# Patient Record
Sex: FEMALE | Race: WHITE | NOT HISPANIC OR LATINO | Employment: OTHER | URBAN - NONMETROPOLITAN AREA
[De-identification: names, ages, dates, MRNs, and addresses within clinical notes are randomized per-mention and may not be internally consistent; named-entity substitution may affect disease eponyms.]

---

## 2023-09-20 ENCOUNTER — DOCUMENTATION (OUTPATIENT)
Dept: PRIMARY CARE | Facility: CLINIC | Age: 83
End: 2023-09-20
Payer: MEDICARE

## 2023-11-07 ENCOUNTER — DOCUMENTATION (OUTPATIENT)
Dept: PRIMARY CARE | Facility: CLINIC | Age: 83
End: 2023-11-07
Payer: MEDICARE

## 2023-11-07 NOTE — PROGRESS NOTES
Attempted to reach patient for monthly follow up calls & was not successful. Left voicemail message with  info & request to return call. Awaiting response.

## 2023-11-14 ENCOUNTER — DOCUMENTATION (OUTPATIENT)
Dept: PRIMARY CARE | Facility: CLINIC | Age: 83
End: 2023-11-14
Payer: MEDICARE

## 2023-11-14 DIAGNOSIS — G80.9 CEREBRAL PALSY, UNSPECIFIED TYPE (MULTI): ICD-10-CM

## 2023-11-14 DIAGNOSIS — E78.5 HYPERLIPIDEMIA, UNSPECIFIED HYPERLIPIDEMIA TYPE: ICD-10-CM

## 2023-11-14 DIAGNOSIS — M81.0 OSTEOPOROSIS, UNSPECIFIED OSTEOPOROSIS TYPE, UNSPECIFIED PATHOLOGICAL FRACTURE PRESENCE: ICD-10-CM

## 2023-11-14 PROCEDURE — 99490 CHRNC CARE MGMT STAFF 1ST 20: CPT | Performed by: FAMILY MEDICINE

## 2023-11-14 NOTE — PROGRESS NOTES
Contacted patient for monthly outreach & states is doing well. Has had both flu & COVID vaccines & continues to wear mask when out & washes hands frequently. Still drives & lives independently, has friends & neighbors who provide assistance when needed. Will be contacting doctor's office to set up Prolia injection which is due in January. Also needs to schedule necessary labs. Taking all medications as prescribed & remains on Sertraline which helps with anxiety. Agreeable to monthly follow up calls.

## 2023-12-03 ENCOUNTER — HOSPITAL ENCOUNTER (INPATIENT)
Facility: HOSPITAL | Age: 83
LOS: 4 days | Discharge: NURSING, REHAB OR LONG-TERM CARE | DRG: 536 | End: 2023-12-07
Attending: INTERNAL MEDICINE | Admitting: INTERNAL MEDICINE
Payer: MEDICARE

## 2023-12-03 ENCOUNTER — APPOINTMENT (OUTPATIENT)
Dept: RADIOLOGY | Facility: HOSPITAL | Age: 83
DRG: 536 | End: 2023-12-03
Payer: MEDICARE

## 2023-12-03 DIAGNOSIS — S32.9XXA FRACTURE OF RIGHT PELVIS, CLOSED, INITIAL ENCOUNTER (MULTI): Primary | ICD-10-CM

## 2023-12-03 LAB
ALBUMIN SERPL BCP-MCNC: 3.9 G/DL (ref 3.4–5)
ALP SERPL-CCNC: 46 U/L (ref 33–136)
ALT SERPL W P-5'-P-CCNC: 7 U/L (ref 7–45)
ANION GAP SERPL CALC-SCNC: 15 MMOL/L (ref 10–20)
AST SERPL W P-5'-P-CCNC: 15 U/L (ref 9–39)
BASOPHILS # BLD AUTO: 0.06 X10*3/UL (ref 0–0.1)
BASOPHILS NFR BLD AUTO: 0.6 %
BILIRUB SERPL-MCNC: 0.2 MG/DL (ref 0–1.2)
BUN SERPL-MCNC: 29 MG/DL (ref 6–23)
CALCIUM SERPL-MCNC: 9.4 MG/DL (ref 8.6–10.3)
CHLORIDE SERPL-SCNC: 103 MMOL/L (ref 98–107)
CO2 SERPL-SCNC: 27 MMOL/L (ref 21–32)
CREAT SERPL-MCNC: 1.25 MG/DL (ref 0.5–1.05)
EOSINOPHIL # BLD AUTO: 0.2 X10*3/UL (ref 0–0.4)
EOSINOPHIL NFR BLD AUTO: 2 %
ERYTHROCYTE [DISTWIDTH] IN BLOOD BY AUTOMATED COUNT: 15 % (ref 11.5–14.5)
GFR SERPL CREATININE-BSD FRML MDRD: 43 ML/MIN/1.73M*2
GLUCOSE SERPL-MCNC: 162 MG/DL (ref 74–99)
HCT VFR BLD AUTO: 35.6 % (ref 36–46)
HGB BLD-MCNC: 10.7 G/DL (ref 12–16)
HOLD SPECIMEN: NORMAL
IMM GRANULOCYTES # BLD AUTO: 0.05 X10*3/UL (ref 0–0.5)
IMM GRANULOCYTES NFR BLD AUTO: 0.5 % (ref 0–0.9)
LACTATE SERPL-SCNC: 2.3 MMOL/L (ref 0.4–2)
LACTATE SERPL-SCNC: 2.8 MMOL/L (ref 0.4–2)
LYMPHOCYTES # BLD AUTO: 1.61 X10*3/UL (ref 0.8–3)
LYMPHOCYTES NFR BLD AUTO: 15.7 %
MCH RBC QN AUTO: 27 PG (ref 26–34)
MCHC RBC AUTO-ENTMCNC: 30.1 G/DL (ref 32–36)
MCV RBC AUTO: 90 FL (ref 80–100)
MONOCYTES # BLD AUTO: 0.48 X10*3/UL (ref 0.05–0.8)
MONOCYTES NFR BLD AUTO: 4.7 %
NEUTROPHILS # BLD AUTO: 7.84 X10*3/UL (ref 1.6–5.5)
NEUTROPHILS NFR BLD AUTO: 76.5 %
NRBC BLD-RTO: 0 /100 WBCS (ref 0–0)
PLATELET # BLD AUTO: 364 X10*3/UL (ref 150–450)
POTASSIUM SERPL-SCNC: 4.1 MMOL/L (ref 3.5–5.3)
PROT SERPL-MCNC: 7.2 G/DL (ref 6.4–8.2)
RBC # BLD AUTO: 3.96 X10*6/UL (ref 4–5.2)
SODIUM SERPL-SCNC: 141 MMOL/L (ref 136–145)
WBC # BLD AUTO: 10.2 X10*3/UL (ref 4.4–11.3)

## 2023-12-03 PROCEDURE — 1100000001 HC PRIVATE ROOM DAILY: Mod: IPSPLIT

## 2023-12-03 PROCEDURE — 2500000004 HC RX 250 GENERAL PHARMACY W/ HCPCS (ALT 636 FOR OP/ED): Mod: IPSPLIT | Performed by: NURSE PRACTITIONER

## 2023-12-03 PROCEDURE — 72192 CT PELVIS W/O DYE: CPT

## 2023-12-03 PROCEDURE — 80053 COMPREHEN METABOLIC PANEL: CPT

## 2023-12-03 PROCEDURE — 99223 1ST HOSP IP/OBS HIGH 75: CPT | Performed by: NURSE PRACTITIONER

## 2023-12-03 PROCEDURE — 73552 X-RAY EXAM OF FEMUR 2/>: CPT | Mod: RT,FY

## 2023-12-03 PROCEDURE — 36415 COLL VENOUS BLD VENIPUNCTURE: CPT

## 2023-12-03 PROCEDURE — 85025 COMPLETE CBC W/AUTO DIFF WBC: CPT

## 2023-12-03 PROCEDURE — 2500000004 HC RX 250 GENERAL PHARMACY W/ HCPCS (ALT 636 FOR OP/ED)

## 2023-12-03 PROCEDURE — 96374 THER/PROPH/DIAG INJ IV PUSH: CPT

## 2023-12-03 PROCEDURE — 96361 HYDRATE IV INFUSION ADD-ON: CPT

## 2023-12-03 PROCEDURE — 99285 EMERGENCY DEPT VISIT HI MDM: CPT

## 2023-12-03 PROCEDURE — 36415 COLL VENOUS BLD VENIPUNCTURE: CPT | Performed by: FAMILY MEDICINE

## 2023-12-03 PROCEDURE — 73502 X-RAY EXAM HIP UNI 2-3 VIEWS: CPT | Mod: RIGHT SIDE | Performed by: RADIOLOGY

## 2023-12-03 PROCEDURE — 72192 CT PELVIS W/O DYE: CPT | Performed by: RADIOLOGY

## 2023-12-03 PROCEDURE — 73552 X-RAY EXAM OF FEMUR 2/>: CPT | Mod: RIGHT SIDE | Performed by: RADIOLOGY

## 2023-12-03 PROCEDURE — 2500000001 HC RX 250 WO HCPCS SELF ADMINISTERED DRUGS (ALT 637 FOR MEDICARE OP): Mod: IPSPLIT | Performed by: NURSE PRACTITIONER

## 2023-12-03 PROCEDURE — 73502 X-RAY EXAM HIP UNI 2-3 VIEWS: CPT | Mod: RT,FY

## 2023-12-03 PROCEDURE — 83605 ASSAY OF LACTIC ACID: CPT | Mod: IPSPLIT

## 2023-12-03 RX ORDER — SENNOSIDES 8.6 MG/1
2 TABLET ORAL 2 TIMES DAILY
Status: DISCONTINUED | OUTPATIENT
Start: 2023-12-03 | End: 2023-12-07 | Stop reason: HOSPADM

## 2023-12-03 RX ORDER — ACETAMINOPHEN 325 MG/1
650 TABLET ORAL ONCE
Status: COMPLETED | OUTPATIENT
Start: 2023-12-03 | End: 2023-12-03

## 2023-12-03 RX ORDER — PRAVASTATIN SODIUM 40 MG/1
40 TABLET ORAL NIGHTLY
COMMUNITY

## 2023-12-03 RX ORDER — SERTRALINE HYDROCHLORIDE 50 MG/1
150 TABLET, FILM COATED ORAL DAILY
Status: DISCONTINUED | OUTPATIENT
Start: 2023-12-03 | End: 2023-12-07 | Stop reason: HOSPADM

## 2023-12-03 RX ORDER — OXYCODONE HYDROCHLORIDE 5 MG/1
5 TABLET ORAL EVERY 6 HOURS PRN
Status: DISCONTINUED | OUTPATIENT
Start: 2023-12-03 | End: 2023-12-07 | Stop reason: HOSPADM

## 2023-12-03 RX ORDER — POLYETHYLENE GLYCOL 3350 17 G/17G
17 POWDER, FOR SOLUTION ORAL DAILY PRN
Status: DISCONTINUED | OUTPATIENT
Start: 2023-12-03 | End: 2023-12-07 | Stop reason: HOSPADM

## 2023-12-03 RX ORDER — PRAVASTATIN SODIUM 20 MG/1
40 TABLET ORAL NIGHTLY
Status: DISCONTINUED | OUTPATIENT
Start: 2023-12-03 | End: 2023-12-07 | Stop reason: HOSPADM

## 2023-12-03 RX ORDER — ACETAMINOPHEN 325 MG/1
975 TABLET ORAL EVERY 8 HOURS
Status: DISCONTINUED | OUTPATIENT
Start: 2023-12-03 | End: 2023-12-07 | Stop reason: HOSPADM

## 2023-12-03 RX ORDER — PANTOPRAZOLE SODIUM 40 MG/10ML
40 INJECTION, POWDER, LYOPHILIZED, FOR SOLUTION INTRAVENOUS
Status: DISCONTINUED | OUTPATIENT
Start: 2023-12-04 | End: 2023-12-07 | Stop reason: HOSPADM

## 2023-12-03 RX ORDER — HEPARIN SODIUM 5000 [USP'U]/ML
5000 INJECTION, SOLUTION INTRAVENOUS; SUBCUTANEOUS EVERY 8 HOURS
Status: DISCONTINUED | OUTPATIENT
Start: 2023-12-03 | End: 2023-12-07 | Stop reason: HOSPADM

## 2023-12-03 RX ORDER — ONDANSETRON 4 MG/1
4 TABLET, FILM COATED ORAL EVERY 8 HOURS PRN
Status: DISCONTINUED | OUTPATIENT
Start: 2023-12-03 | End: 2023-12-07 | Stop reason: HOSPADM

## 2023-12-03 RX ORDER — ONDANSETRON HYDROCHLORIDE 2 MG/ML
4 INJECTION, SOLUTION INTRAVENOUS EVERY 8 HOURS PRN
Status: DISCONTINUED | OUTPATIENT
Start: 2023-12-03 | End: 2023-12-07 | Stop reason: HOSPADM

## 2023-12-03 RX ORDER — SODIUM CHLORIDE 9 MG/ML
100 INJECTION, SOLUTION INTRAVENOUS CONTINUOUS
Status: DISCONTINUED | OUTPATIENT
Start: 2023-12-03 | End: 2023-12-05

## 2023-12-03 RX ORDER — PANTOPRAZOLE SODIUM 40 MG/1
40 TABLET, DELAYED RELEASE ORAL
Status: DISCONTINUED | OUTPATIENT
Start: 2023-12-04 | End: 2023-12-07 | Stop reason: HOSPADM

## 2023-12-03 RX ORDER — SERTRALINE HYDROCHLORIDE 100 MG/1
150 TABLET, FILM COATED ORAL DAILY
COMMUNITY

## 2023-12-03 RX ADMIN — HYDROMORPHONE HYDROCHLORIDE 0.5 MG: 1 INJECTION, SOLUTION INTRAMUSCULAR; INTRAVENOUS; SUBCUTANEOUS at 18:49

## 2023-12-03 RX ADMIN — SODIUM CHLORIDE 1000 ML: 9 INJECTION, SOLUTION INTRAVENOUS at 18:49

## 2023-12-03 RX ADMIN — ACETAMINOPHEN 650 MG: 325 TABLET ORAL at 14:30

## 2023-12-03 RX ADMIN — SENNOSIDES 17.2 MG: 8.6 TABLET, FILM COATED ORAL at 21:52

## 2023-12-03 RX ADMIN — ACETAMINOPHEN 975 MG: 325 TABLET ORAL at 21:52

## 2023-12-03 RX ADMIN — SODIUM CHLORIDE 100 ML/HR: 9 INJECTION, SOLUTION INTRAVENOUS at 22:39

## 2023-12-03 RX ADMIN — SODIUM CHLORIDE 500 ML: 900 INJECTION, SOLUTION INTRAVENOUS at 17:44

## 2023-12-03 SDOH — SOCIAL STABILITY: SOCIAL INSECURITY: WERE YOU ABLE TO COMPLETE ALL THE BEHAVIORAL HEALTH SCREENINGS?: YES

## 2023-12-03 SDOH — SOCIAL STABILITY: SOCIAL INSECURITY: HAVE YOU HAD THOUGHTS OF HARMING ANYONE ELSE?: NO

## 2023-12-03 ASSESSMENT — ACTIVITIES OF DAILY LIVING (ADL)
DRESSING YOURSELF: INDEPENDENT
GROOMING: INDEPENDENT
PATIENT'S MEMORY ADEQUATE TO SAFELY COMPLETE DAILY ACTIVITIES?: YES
HEARING - LEFT EAR: HEARING AID
ADEQUATE_TO_COMPLETE_ADL: YES
HEARING - RIGHT EAR: HEARING AID
TOILETING: INDEPENDENT
JUDGMENT_ADEQUATE_SAFELY_COMPLETE_DAILY_ACTIVITIES: YES
BATHING: INDEPENDENT
LACK_OF_TRANSPORTATION: NO
FEEDING YOURSELF: INDEPENDENT
WALKS IN HOME: INDEPENDENT

## 2023-12-03 ASSESSMENT — PAIN DESCRIPTION - ORIENTATION: ORIENTATION: RIGHT

## 2023-12-03 ASSESSMENT — COGNITIVE AND FUNCTIONAL STATUS - GENERAL
TOILETING: A LITTLE
PATIENT BASELINE BEDBOUND: NO
WALKING IN HOSPITAL ROOM: A LITTLE
MOVING FROM LYING ON BACK TO SITTING ON SIDE OF FLAT BED WITH BEDRAILS: A LITTLE
CLIMB 3 TO 5 STEPS WITH RAILING: A LITTLE
HELP NEEDED FOR BATHING: A LITTLE
DRESSING REGULAR LOWER BODY CLOTHING: A LITTLE
MOVING TO AND FROM BED TO CHAIR: A LITTLE
STANDING UP FROM CHAIR USING ARMS: A LITTLE
DAILY ACTIVITIY SCORE: 20
DRESSING REGULAR UPPER BODY CLOTHING: A LITTLE
MOBILITY SCORE: 18
TURNING FROM BACK TO SIDE WHILE IN FLAT BAD: A LITTLE

## 2023-12-03 ASSESSMENT — PAIN - FUNCTIONAL ASSESSMENT
PAIN_FUNCTIONAL_ASSESSMENT: 0-10
PAIN_FUNCTIONAL_ASSESSMENT: 0-10

## 2023-12-03 ASSESSMENT — COLUMBIA-SUICIDE SEVERITY RATING SCALE - C-SSRS
2. HAVE YOU ACTUALLY HAD ANY THOUGHTS OF KILLING YOURSELF?: NO
6. HAVE YOU EVER DONE ANYTHING, STARTED TO DO ANYTHING, OR PREPARED TO DO ANYTHING TO END YOUR LIFE?: NO
1. IN THE PAST MONTH, HAVE YOU WISHED YOU WERE DEAD OR WISHED YOU COULD GO TO SLEEP AND NOT WAKE UP?: NO

## 2023-12-03 ASSESSMENT — LIFESTYLE VARIABLES
HOW MANY STANDARD DRINKS CONTAINING ALCOHOL DO YOU HAVE ON A TYPICAL DAY: PATIENT DOES NOT DRINK
HOW OFTEN DO YOU HAVE A DRINK CONTAINING ALCOHOL: NEVER
AUDIT-C TOTAL SCORE: 0
HOW OFTEN DO YOU HAVE 6 OR MORE DRINKS ON ONE OCCASION: NEVER
AUDIT-C TOTAL SCORE: 0
SKIP TO QUESTIONS 9-10: 1

## 2023-12-03 ASSESSMENT — PAIN SCALES - GENERAL
PAINLEVEL_OUTOF10: 2
PAINLEVEL_OUTOF10: 10 - WORST POSSIBLE PAIN
PAINLEVEL_OUTOF10: 2

## 2023-12-03 ASSESSMENT — PATIENT HEALTH QUESTIONNAIRE - PHQ9
2. FEELING DOWN, DEPRESSED OR HOPELESS: NOT AT ALL
SUM OF ALL RESPONSES TO PHQ9 QUESTIONS 1 & 2: 0
1. LITTLE INTEREST OR PLEASURE IN DOING THINGS: NOT AT ALL

## 2023-12-03 ASSESSMENT — PAIN DESCRIPTION - PAIN TYPE: TYPE: ACUTE PAIN

## 2023-12-03 ASSESSMENT — PAIN DESCRIPTION - LOCATION: LOCATION: LEG

## 2023-12-03 NOTE — ED PROVIDER NOTES
Limitations to history: None  Independent Historians: Family  External Records Reviewed: HIE, OARRS, outpatient notes, inpatient notes, paper charts if needed    History of Present Illness:  Patient is a 83-year-old female presents to ED chief complaint of right hip pain.  Patient reports that she was in the shower earlier, when her left foot accidentally slipped, causing her to fall on her right hip.  Patient reports she did not lose consciousness, did not sustain head injury and is not having any neck and/or back pain.  Patient denies use of blood thinners.  Patient reports it is difficult to bear weight after the fall this morning.  Patient reports pain to her right hip, right femur.  Patient reports a past medical history of cerebral palsy.  Patient denies any dizziness, chest pain, shortness of breath prior to or after falling.  Patient is alert and oriented x 3 upon examination, and otherwise no apparent distress.      Denies HA, C/P, SOB, ABD pain, Nausea, Vomiting, Diarrhea, Weakness, Dizziness, Fever, Chills.    PMFSH:   As per HPI, otherwise nurses notes reviewed in EMR    Physical Exam:  Appearance: Alert, oriented x3, supine on exam table with head elevated, cooperative, in no acute distress. Well nourished & well hydrated.      Skin: Intact, dry skin, no lesions, rash, petechiae or purpura.     Eyes: PERRLA, EOMs intact, Conjunctiva pink with no redness or exudates. No scleral icterus.     Ears: Hearing grossly intact.      Nose: Nares patent, no epistaxis.     Mouth: Dentition without concerning abnormalities. no obstruction of posterior pharynx.     Neck: Supple, without meningismus. Trachea at midline.     Pulmonary: Clear bilaterally with good chest wall excursion. No rales, rhonchi or wheezing. No accessory muscle use or stridor. Talking in full sentences.     Cardiac: Normal S1, S2 without murmur, rub, gallop or extrasystole.     Abdomen: Soft, nontender to light and deep palpation to all  quadrants, normoactive bowel sounds.  No palpable organomegaly.  No rebound or guarding.     Genitourinary: Physical exam deferred.     Musculoskeletal: Mild pain on palpation most notable near the lateral right hip joint.  No obvious deformity and/or ecchymosis present.  Mild pain on palpation external examination of the medial and lateral right femur.  No obvious deformity and/or ecchymosis present.  Distal pulses present and equal bilaterally.  Range of motion slightly limited due to pain.  Rest of the exam reveals no pain on palpation, instability, or deformity. Pulses full and equal. No cyanosis or clubbing. capillary refill <2 seconds to all examined digits.     Neurological:  Cranial nerves II through XII are grossly intact, normal sensation, no weakness, no focal findings identified.      Psychiatric: Appropriate mood and affect.      Labs Reviewed   GRAY TOP   URINALYSIS WITH REFLEX MICROSCOPIC   CBC WITH AUTO DIFFERENTIAL   COMPREHENSIVE METABOLIC PANEL   LACTATE      CT pelvis wo IV contrast   Final Result   1. Right pelvic fracture, as above.        MACRO:   None.        Signed by: Kartik Schilling 12/3/2023 3:50 PM   Dictation workstation:   [id number]      XR hip right with pelvis when performed 2 or 3 views   Final Result   Comminuted displaced fracture of the right iliac bone. Further   evaluation with pelvic CT is suggested.        No definite bilateral hip fracture or right knee fracture.        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             MACRO:   None        Signed by: Luzmaria Ag 12/3/2023 2:27 PM   Dictation workstation:   [id number]      XR femur right 2+ views   Final Result   Comminuted displaced fracture of the right iliac bone. Further   evaluation with pelvic CT is suggested.        No definite bilateral hip fracture or right knee fracture.        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             MACRO:   None         Signed by: Luzmaria Ag 12/3/2023 2:27 PM   Dictation workstation:   [id number]             Repeat Evaluation below    Summary:  Medical Decision Making:   Patient presented as described in HPI. Patient case including ROS, PE, and treatment and plan discussed with ED attending if attached as cosigner. Due to patients presentation orders completed include as documented.  Patient evaluated for complaints of a mechanical fall.  Right pelvic fracture.  Patient lives at home, is nonambulatory, and is agreeable for admission for further rehab.  Reports she does not need medication at this time for pain.  Discussed with ED attending Dr. Sung.  Admitting team agrees to admit patient for further evaluation and treatment.    Tests/Medications/Escalations of Care considered but not given:    Patient care discussed with: N/A  Social Determinants affecting care: N/A    Final diagnosis and disposition as documented in impression         Disposition:  admit         This note has been transcribed using voice recognition and may contain grammatical errors, misplaced words, incorrect words, incorrect phrases or other errors.     Irais Joy, APRN-CNP  12/03/23 [id number]

## 2023-12-04 LAB
ANION GAP SERPL CALC-SCNC: 10 MMOL/L (ref 10–20)
APPEARANCE UR: CLEAR
BILIRUB UR STRIP.AUTO-MCNC: NEGATIVE MG/DL
BUN SERPL-MCNC: 29 MG/DL (ref 6–23)
CALCIUM SERPL-MCNC: 8.5 MG/DL (ref 8.6–10.3)
CHLORIDE SERPL-SCNC: 109 MMOL/L (ref 98–107)
CK MB CFR SERPL CALC: 1 %MB OF CK
CK MB SERPL-CCNC: 3.2 NG/ML
CK SERPL-CCNC: 483 U/L (ref 0–215)
CO2 SERPL-SCNC: 26 MMOL/L (ref 21–32)
COLOR UR: YELLOW
CREAT SERPL-MCNC: 1.14 MG/DL (ref 0.5–1.05)
ERYTHROCYTE [DISTWIDTH] IN BLOOD BY AUTOMATED COUNT: 14.8 % (ref 11.5–14.5)
GFR SERPL CREATININE-BSD FRML MDRD: 48 ML/MIN/1.73M*2
GLUCOSE SERPL-MCNC: 104 MG/DL (ref 74–99)
GLUCOSE UR STRIP.AUTO-MCNC: NEGATIVE MG/DL
HCT VFR BLD AUTO: 31.5 % (ref 36–46)
HGB BLD-MCNC: 9.5 G/DL (ref 12–16)
KETONES UR STRIP.AUTO-MCNC: ABNORMAL MG/DL
LACTATE SERPL-SCNC: 2 MMOL/L (ref 0.4–2)
LEUKOCYTE ESTERASE UR QL STRIP.AUTO: NEGATIVE
MCH RBC QN AUTO: 27.1 PG (ref 26–34)
MCHC RBC AUTO-ENTMCNC: 30.2 G/DL (ref 32–36)
MCV RBC AUTO: 90 FL (ref 80–100)
NITRITE UR QL STRIP.AUTO: NEGATIVE
NRBC BLD-RTO: 0 /100 WBCS (ref 0–0)
PH UR STRIP.AUTO: 5 [PH]
PLATELET # BLD AUTO: 233 X10*3/UL (ref 150–450)
POTASSIUM SERPL-SCNC: 3.9 MMOL/L (ref 3.5–5.3)
PROT UR STRIP.AUTO-MCNC: ABNORMAL MG/DL
RBC # BLD AUTO: 3.5 X10*6/UL (ref 4–5.2)
RBC # UR STRIP.AUTO: NEGATIVE /UL
RBC #/AREA URNS AUTO: NORMAL /HPF
SODIUM SERPL-SCNC: 141 MMOL/L (ref 136–145)
SP GR UR STRIP.AUTO: 1.03
SQUAMOUS #/AREA URNS AUTO: NORMAL /HPF
UROBILINOGEN UR STRIP.AUTO-MCNC: <2 MG/DL
WBC # BLD AUTO: 5.2 X10*3/UL (ref 4.4–11.3)
WBC #/AREA URNS AUTO: NORMAL /HPF

## 2023-12-04 PROCEDURE — 97166 OT EVAL MOD COMPLEX 45 MIN: CPT | Mod: GO,IPSPLIT | Performed by: OCCUPATIONAL THERAPIST

## 2023-12-04 PROCEDURE — 96372 THER/PROPH/DIAG INJ SC/IM: CPT | Mod: IPSPLIT | Performed by: NURSE PRACTITIONER

## 2023-12-04 PROCEDURE — 99233 SBSQ HOSP IP/OBS HIGH 50: CPT

## 2023-12-04 PROCEDURE — 2500000001 HC RX 250 WO HCPCS SELF ADMINISTERED DRUGS (ALT 637 FOR MEDICARE OP): Mod: IPSPLIT | Performed by: NURSE PRACTITIONER

## 2023-12-04 PROCEDURE — 1100000001 HC PRIVATE ROOM DAILY: Mod: IPSPLIT

## 2023-12-04 PROCEDURE — 99222 1ST HOSP IP/OBS MODERATE 55: CPT | Performed by: PHYSICIAN ASSISTANT

## 2023-12-04 PROCEDURE — 36415 COLL VENOUS BLD VENIPUNCTURE: CPT | Mod: IPSPLIT

## 2023-12-04 PROCEDURE — 2500000004 HC RX 250 GENERAL PHARMACY W/ HCPCS (ALT 636 FOR OP/ED): Mod: IPSPLIT | Performed by: NURSE PRACTITIONER

## 2023-12-04 PROCEDURE — 97161 PT EVAL LOW COMPLEX 20 MIN: CPT | Mod: GP,IPSPLIT | Performed by: PHYSICAL THERAPIST

## 2023-12-04 PROCEDURE — 80048 BASIC METABOLIC PNL TOTAL CA: CPT | Mod: IPSPLIT | Performed by: NURSE PRACTITIONER

## 2023-12-04 PROCEDURE — 81001 URINALYSIS AUTO W/SCOPE: CPT | Mod: IPSPLIT

## 2023-12-04 PROCEDURE — 97535 SELF CARE MNGMENT TRAINING: CPT | Mod: GO,IPSPLIT | Performed by: OCCUPATIONAL THERAPIST

## 2023-12-04 PROCEDURE — 36415 COLL VENOUS BLD VENIPUNCTURE: CPT | Mod: IPSPLIT | Performed by: NURSE PRACTITIONER

## 2023-12-04 PROCEDURE — 82553 CREATINE MB FRACTION: CPT | Mod: GENLAB

## 2023-12-04 PROCEDURE — 82550 ASSAY OF CK (CPK): CPT | Mod: IPSPLIT

## 2023-12-04 PROCEDURE — 85027 COMPLETE CBC AUTOMATED: CPT | Mod: IPSPLIT | Performed by: NURSE PRACTITIONER

## 2023-12-04 PROCEDURE — 83605 ASSAY OF LACTIC ACID: CPT | Mod: IPSPLIT

## 2023-12-04 RX ADMIN — ACETAMINOPHEN 975 MG: 325 TABLET ORAL at 21:34

## 2023-12-04 RX ADMIN — PANTOPRAZOLE SODIUM 40 MG: 40 TABLET, DELAYED RELEASE ORAL at 06:12

## 2023-12-04 RX ADMIN — ACETAMINOPHEN 975 MG: 325 TABLET ORAL at 05:10

## 2023-12-04 RX ADMIN — SODIUM CHLORIDE 100 ML/HR: 9 INJECTION, SOLUTION INTRAVENOUS at 10:54

## 2023-12-04 RX ADMIN — HEPARIN SODIUM 5000 UNITS: 5000 INJECTION INTRAVENOUS; SUBCUTANEOUS at 10:55

## 2023-12-04 RX ADMIN — ACETAMINOPHEN 975 MG: 325 TABLET ORAL at 14:47

## 2023-12-04 RX ADMIN — SENNOSIDES 17.2 MG: 8.6 TABLET, FILM COATED ORAL at 21:34

## 2023-12-04 RX ADMIN — SERTRALINE HYDROCHLORIDE 150 MG: 50 TABLET ORAL at 09:26

## 2023-12-04 RX ADMIN — PRAVASTATIN SODIUM 40 MG: 20 TABLET ORAL at 21:34

## 2023-12-04 RX ADMIN — HEPARIN SODIUM 5000 UNITS: 5000 INJECTION INTRAVENOUS; SUBCUTANEOUS at 19:58

## 2023-12-04 ASSESSMENT — PAIN - FUNCTIONAL ASSESSMENT
PAIN_FUNCTIONAL_ASSESSMENT: 0-10

## 2023-12-04 ASSESSMENT — COGNITIVE AND FUNCTIONAL STATUS - GENERAL
EATING MEALS: A LITTLE
DRESSING REGULAR LOWER BODY CLOTHING: A LITTLE
EATING MEALS: A LITTLE
DRESSING REGULAR UPPER BODY CLOTHING: A LITTLE
DRESSING REGULAR UPPER BODY CLOTHING: A LITTLE
MOBILITY SCORE: 14
TOILETING: A LOT
DAILY ACTIVITIY SCORE: 18
CLIMB 3 TO 5 STEPS WITH RAILING: A LITTLE
TURNING FROM BACK TO SIDE WHILE IN FLAT BAD: A LITTLE
PERSONAL GROOMING: A LITTLE
STANDING UP FROM CHAIR USING ARMS: A LOT
DAILY ACTIVITIY SCORE: 17
CLIMB 3 TO 5 STEPS WITH RAILING: A LOT
WALKING IN HOSPITAL ROOM: A LITTLE
MOVING TO AND FROM BED TO CHAIR: A LITTLE
MOVING FROM LYING ON BACK TO SITTING ON SIDE OF FLAT BED WITH BEDRAILS: A LITTLE
HELP NEEDED FOR BATHING: A LITTLE
MOVING FROM LYING ON BACK TO SITTING ON SIDE OF FLAT BED WITH BEDRAILS: A LITTLE
MOVING TO AND FROM BED TO CHAIR: A LOT
TOILETING: A LITTLE
DRESSING REGULAR LOWER BODY CLOTHING: A LOT
HELP NEEDED FOR BATHING: A LOT
DRESSING REGULAR LOWER BODY CLOTHING: A LITTLE
PERSONAL GROOMING: A LITTLE
DAILY ACTIVITIY SCORE: 18
WALKING IN HOSPITAL ROOM: A LOT
TURNING FROM BACK TO SIDE WHILE IN FLAT BAD: A LITTLE
STANDING UP FROM CHAIR USING ARMS: A LITTLE
TOILETING: A LITTLE
DRESSING REGULAR UPPER BODY CLOTHING: A LITTLE
HELP NEEDED FOR BATHING: A LITTLE
MOBILITY SCORE: 18

## 2023-12-04 ASSESSMENT — PAIN SCALES - GENERAL
PAINLEVEL_OUTOF10: 0 - NO PAIN
PAINLEVEL_OUTOF10: 4
PAINLEVEL_OUTOF10: 3
PAINLEVEL_OUTOF10: 4
PAINLEVEL_OUTOF10: 5 - MODERATE PAIN
PAINLEVEL_OUTOF10: 0 - NO PAIN
PAINLEVEL_OUTOF10: 2
PAINLEVEL_OUTOF10: 2
PAINLEVEL_OUTOF10: 5 - MODERATE PAIN
PAINLEVEL_OUTOF10: 5 - MODERATE PAIN

## 2023-12-04 ASSESSMENT — PAIN DESCRIPTION - ORIENTATION: ORIENTATION: RIGHT

## 2023-12-04 ASSESSMENT — ACTIVITIES OF DAILY LIVING (ADL)
ADL_ASSISTANCE: INDEPENDENT
BATHING_ASSISTANCE: MODERATE
LACK_OF_TRANSPORTATION: NO
HOME_MANAGEMENT_TIME_ENTRY: 15

## 2023-12-04 ASSESSMENT — PAIN DESCRIPTION - LOCATION: LOCATION: HIP

## 2023-12-04 NOTE — PROGRESS NOTES
Discussed plan of care in interdisciplinary rounds,  patient admitted with pelvic fracture, ortho consult pending.  Plan to continue IV fluids for elevated CK and lactate levels.    Met with patient at bedside to discuss discharge planning.  Patient is A&OX3 from home alone, she resides in a mobile home with 4 steps to enter and 3 steps to the kitchen.   Prior to admission patient was independent in all ADL's and IADL's, uses a cane, walker, and has grab bars in the shower.  Patient had a fall at home in the shower, and the grab bar came loose as she grabbed onto it during her fall.   Patient has a life alert button and her niece Brittney is her primary support.  PCP is Dr. Luzmaria Richardson.    Discussed MOD level therapy recommendations and patient is in agreement with skilled rehab on discharge.   Discussed SNF preferences and patient chose 1. [facility]  and 2. [facility].   SNF referral built and sent and she was accepted by her [facility].   No precert is needed, they are willing to accept on or after 12/6/23.        Plan:  [facility] on or after 12/6.  No precert needed.  MD will need to sign/certify the [facility] for skilled rehab prior to discharge

## 2023-12-04 NOTE — H&P
History and Physical         Polina Wilcox 83 y.o. 1940     History Of Present Illness  Polina Wilcox is a 83 y.o. female presented to [facility] from home. Patient sustained a fall in her shower. She lives alone. She has a medic alert pendant and was able to geovanni 911.  In ED  XR Right FEMUR There is a comminuted displaced fracture of the right iliac bone. The bilateral hip joints are well preserved. No dislocation is noted.   Calcific densities are noted in the right proximal tibia, which may be related to bone infarct or enchondroma.   Order PT OT Consult and Treat Consult SW for discharge planning to SNF  Labs showed Lactate 2.3  Order NS @ 100 ml/hr . On exam patient resting in bed. Alert [facility]. Reports pain is well controlled. No  acute medical needs.      Past Medical History  Past Medical History:   Diagnosis Date    Anxiety and depression     Depression     Hyperlipidemia         Surgical History  She has no past surgical history on file.     Social History  Social History     Socioeconomic History    Marital status: Single     Spouse name: Not on file    Number of children: Not on file    Years of education: Not on file    Highest education level: Not on file   Occupational History    Not on file   Tobacco Use    Smoking status: Never    Smokeless tobacco: Never   Substance and Sexual Activity    Alcohol use: Not on file    Drug use: Never    Sexual activity: Not on file   Other Topics Concern    Not on file   Social History Narrative    Not on file     Social Determinants of Health     Financial Resource Strain: Low Risk  (12/3/2023)    Overall Financial Resource Strain (CARDIA)     Difficulty of Paying Living Expenses: Not very hard   Food Insecurity: Not on file   Transportation Needs: No Transportation Needs (12/3/2023)    PRAPARE - Transportation     Lack of Transportation (Medical): No     Lack of Transportation (Non-Medical): No   Physical Activity:  Not on file   Stress: Not on file   Social Connections: Not on file   Intimate Partner Violence: Not on file   Housing Stability: Low Risk  (12/3/2023)    Housing Stability Vital Sign     Unable to Pay for Housing in the Last Year: No     Number of Places Lived in the Last Year: 1     Unstable Housing in the Last Year: No        Family History  No family history on file.     Allergies  No Known Allergies     Vital Signs  Temp:  [36.2 °C (97.2 °F)-37.1 °C (98.8 °F)] 37.1 °C (98.8 °F)  Heart Rate:  [67-83] 83  Resp:  [16-18] 18  BP: (106-136)/(61) 136/61    Home Medications   Prior to Admission Medications   Prescriptions Last Dose Informant Patient Reported? Taking?   pravastatin (Pravachol) 40 mg tablet 12/3/2023  Yes No   Sig: Take 1 tablet (40 mg) by mouth once daily at bedtime.   sertraline (Zoloft) 100 mg tablet 12/3/2023  Yes No   Sig: Take 1.5 tablets (150 mg) by mouth once daily.      Facility-Administered Medications: None       New Hospital Orders  Current Facility-Administered Medications   Medication Dose Route Frequency Provider Last Rate Last Admin    acetaminophen (Tylenol) tablet 975 mg  975 mg oral q8h NAMRATA Charlton   975 mg at 12/03/23 2152    heparin (porcine) injection 5,000 Units  5,000 Units subcutaneous q8h NAMRATA Charlton        ondansetron (Zofran) tablet 4 mg  4 mg oral q8h PRN NAMRATA Charlton        Or    ondansetron (Zofran) injection 4 mg  4 mg intravenous q8h PRN NAMRATA Charlton        oxyCODONE (Roxicodone) immediate release tablet 5 mg  5 mg oral q6h PRN NAMRATA Charlton        [START ON 12/4/2023] pantoprazole (ProtoNix) EC tablet 40 mg  40 mg oral Daily before breakfast NAMRATA Charlton        Or    [START ON 12/4/2023] pantoprazole (ProtoNix) injection 40 mg  40 mg intravenous Daily before breakfast NAMRATA Charlton        polyethylene glycol (Glycolax, Miralax) packet 17 g  17 g oral Daily PRN  "Polina LEIGHANN Mcgowan-MARIE        pravastatin (Pravachol) tablet 40 mg  40 mg oral Nightly LEIGHANN Mendoza-CNP        sennosides (Senokot) tablet 17.2 mg  2 tablet oral BID Polina EVANS NAMRATA Trent   17.2 mg at 12/03/23 2152    sertraline (Zoloft) tablet 150 mg  150 mg oral Daily LEIGHANN Mendoza-CNP        sodium chloride 0.9% infusion  100 mL/hr intravenous Continuous NAMRATA Mendoza 100 mL/hr at 12/03/23 2239 100 mL/hr at 12/03/23 2239           Review of Systems   Musculoskeletal:         Right Hip  Pain    All other systems reviewed and are negative.          Physical Exam  Constitutional:       Appearance: Normal appearance. She is ill-appearing.   HENT:      Head: Normocephalic and atraumatic.      Mouth/Throat:      Mouth: Mucous membranes are dry.   Eyes:      Extraocular Movements: Extraocular movements intact.      Pupils: Pupils are equal, round, and reactive to light.   Cardiovascular:      Rate and Rhythm: Normal rate and regular rhythm.      Pulses: Normal pulses.      Heart sounds: Normal heart sounds.   Pulmonary:      Effort: Pulmonary effort is normal.      Breath sounds: Normal breath sounds.   Abdominal:      General: Abdomen is flat. Bowel sounds are normal.      Palpations: Abdomen is soft.   Musculoskeletal:         General: Signs of injury present.      Cervical back: Normal range of motion and neck supple.      Right lower leg: Edema present.   Skin:     General: Skin is warm and dry.   Neurological:      General: No focal deficit present.      Mental Status: She is alert and oriented to person, place, and time.   Psychiatric:         Mood and Affect: Mood normal.         Behavior: Behavior normal.            Last Recorded Vitals  Blood pressure 136/61, pulse 83, temperature 37.1 °C (98.8 °F), temperature source Temporal, resp. rate 18, height 1.549 m (5' 1\"), weight (!) 38.4 kg (84 lb 10.5 oz), SpO2 95 %.    Relevant Results  Results for orders placed or performed during " the hospital encounter of 12/03/23   Light Blue Top   Result Value Ref Range    Extra Tube Hold for add-ons.    PST Top   Result Value Ref Range    Extra Tube Hold for add-ons.    Lavender Top   Result Value Ref Range    Extra Tube Hold for add-ons.    SST TOP   Result Value Ref Range    Extra Tube Hold for add-ons.    Gray Top   Result Value Ref Range    Extra Tube Hold for add-ons.    CBC and Auto Differential   Result Value Ref Range    WBC 10.2 4.4 - 11.3 x10*3/uL    nRBC 0.0 0.0 - 0.0 /100 WBCs    RBC 3.96 (L) 4.00 - 5.20 x10*6/uL    Hemoglobin 10.7 (L) 12.0 - 16.0 g/dL    Hematocrit 35.6 (L) 36.0 - 46.0 %    MCV 90 80 - 100 fL    MCH 27.0 26.0 - 34.0 pg    MCHC 30.1 (L) 32.0 - 36.0 g/dL    RDW 15.0 (H) 11.5 - 14.5 %    Platelets 364 150 - 450 x10*3/uL    Neutrophils % 76.5 40.0 - 80.0 %    Immature Granulocytes %, Automated 0.5 0.0 - 0.9 %    Lymphocytes % 15.7 13.0 - 44.0 %    Monocytes % 4.7 2.0 - 10.0 %    Eosinophils % 2.0 0.0 - 6.0 %    Basophils % 0.6 0.0 - 2.0 %    Neutrophils Absolute 7.84 (H) 1.60 - 5.50 x10*3/uL    Immature Granulocytes Absolute, Automated 0.05 0.00 - 0.50 x10*3/uL    Lymphocytes Absolute 1.61 0.80 - 3.00 x10*3/uL    Monocytes Absolute 0.48 0.05 - 0.80 x10*3/uL    Eosinophils Absolute 0.20 0.00 - 0.40 x10*3/uL    Basophils Absolute 0.06 0.00 - 0.10 x10*3/uL   Comprehensive metabolic panel   Result Value Ref Range    Glucose 162 (H) 74 - 99 mg/dL    Sodium 141 136 - 145 mmol/L    Potassium 4.1 3.5 - 5.3 mmol/L    Chloride 103 98 - 107 mmol/L    Bicarbonate 27 21 - 32 mmol/L    Anion Gap 15 10 - 20 mmol/L    Urea Nitrogen 29 (H) 6 - 23 mg/dL    Creatinine 1.25 (H) 0.50 - 1.05 mg/dL    eGFR 43 (L) >60 mL/min/1.73m*2    Calcium 9.4 8.6 - 10.3 mg/dL    Albumin 3.9 3.4 - 5.0 g/dL    Alkaline Phosphatase 46 33 - 136 U/L    Total Protein 7.2 6.4 - 8.2 g/dL    AST 15 9 - 39 U/L    Bilirubin, Total 0.2 0.0 - 1.2 mg/dL    ALT 7 7 - 45 U/L   Lactate   Result Value Ref Range    Lactate 2.8 (H)  0.4 - 2.0 mmol/L   Lactate   Result Value Ref Range    Lactate 2.3 (H) 0.4 - 2.0 mmol/L        Imaging   CT pelvis wo IV contrast    Result Date: 12/3/2023  Interpreted By:  Kartik Schilling, STUDY: CT PELVIS WO IV CONTRAST; 12/3/2023 3:23 pm   INDICATION: Signs/Symptoms:right hip and pelvic pain post fall.   COMPARISON: None.   ACCESSION NUMBER(S): [id number]   ORDERING CLINICIAN: SEB GREEN   TECHNIQUE: Contiguous axial CT images were obtained through the pelvis at 3 mm slice thickness without contrast administration. The images were then reconstructed in the coronal and sagittal planes.   FINDINGS: BONES: There is a comminuted fracture identified involving the anterior aspect of right pelvic wing. No additional fracture is seen.   Mild degenerative changes are seen in the sacroiliac joints bilaterally. The hips are grossly well-preserved.   SOFT TISSUES: No definite masses or abnormal fluid collections are identified.   No definite hernias are identified.   PERITONEUM: Evaluation of the visualized soft tissues of the abdomen is limited by the lack of intravenous contrast. Within this limitation, no gross mass or lymphadenopathy is identified.       1. Right pelvic fracture, as above.   MACRO: None.   Signed by: Kartik Schilling 12/3/2023 3:50 PM Dictation workstation:   [id number]    XR hip right with pelvis when performed 2 or 3 views    Result Date: 12/3/2023  Interpreted By:  Luzmaria Ag, STUDY: XR HIP RIGHT WITH PELVIS WHEN PERFORMED 2 OR 3 VIEWS; XR FEMUR RIGHT 2+ VIEWS;  12/3/2023 2:23 pm   INDICATION: Signs/Symptoms:right hip pain after fall in shower this morning; Signs/Symptoms:right femur pain after a fall in the shower this morning.   COMPARISON: None.   ACCESSION NUMBER(S): [id number]; [id number]   ORDERING CLINICIAN: SEB GREEN   FINDINGS: There is a comminuted displaced fracture of the right iliac bone. The bilateral hip joints are well preserved. No dislocation is noted.   Calcific densities  are noted in the right proximal tibia, which may be related to bone infarct or enchondroma.       Comminuted displaced fracture of the right iliac bone. Further evaluation with pelvic CT is suggested.   No definite bilateral hip fracture or right knee fracture.   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     MACRO: None   Signed by: Luzmaria Ag 12/3/2023 2:27 PM Dictation workstation:   [id number]    XR femur right 2+ views    Result Date: 12/3/2023  Interpreted By:  Luzmaria Ag, STUDY: XR HIP RIGHT WITH PELVIS WHEN PERFORMED 2 OR 3 VIEWS; XR FEMUR RIGHT 2+ VIEWS;  12/3/2023 2:23 pm   INDICATION: Signs/Symptoms:right hip pain after fall in shower this morning; Signs/Symptoms:right femur pain after a fall in the shower this morning.   COMPARISON: None.   ACCESSION NUMBER(S): [id number]; [id number]   ORDERING CLINICIAN: SEB GREEN   FINDINGS: There is a comminuted displaced fracture of the right iliac bone. The bilateral hip joints are well preserved. No dislocation is noted.   Calcific densities are noted in the right proximal tibia, which may be related to bone infarct or enchondroma.       Comminuted displaced fracture of the right iliac bone. Further evaluation with pelvic CT is suggested.   No definite bilateral hip fracture or right knee fracture.   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     MACRO: None   Signed by: Luzmaria Ag 12/3/2023 2:27 PM Dictation workstation:   [id number]        Assessment/Plan   Principal Problem:    Fracture of right pelvis, closed, initial encounter (CMS/[facility])  Patient fell in the shower.   XR Right FEMUR There is a comminuted displaced fracture of the right iliac bone. The bilateral hip joints are well preserved. No dislocation is noted.   Calcific densities are noted in the right proximal tibia, which may be related to bone infarct or enchondroma.     PT OT Consult and Treat   Consult  for discharge  planning to SNF   Pain Mgt  Lactate 2.3   NS @ 100 ml/hr   Repeat labs in AM  Consult Ortho      Anxiety/Depression   Patient denies worsening anxiety and depression  Continue with home med Zoloft       DVT Prophylaxis Lovenox   Fluids:  mg/hr    Electrolytes: replace as needed  Nutrition: Regular   Adjuncts: PIV             Total accumulated time spent face to face and not face to face preparing to see the patient, obtaining and reviewing separately obtained history; performing a medically appropriate examination and/or evaluation; counseling and educating the patient, family; ordering medications, tests, or procedures; referring and communicating with other health care professionals; documenting clinical information in the patient's medical record; independently interpreting results and communicating the results to the patient, family; and care coordination was 40 minutes      LEIGHANN Mendoza-CNP

## 2023-12-04 NOTE — PROGRESS NOTES
Physical Therapy    Physical Therapy Evaluation    Patient Name: Polina Wilcox  MRN: [id number]  Today's Date: 12/4/2023   Time Calculation  Start Time: 0900  Stop Time: 0915  Time Calculation (min): 15 min    Assessment/Plan   PT Assessment  PT Assessment Results: Decreased strength, Decreased endurance, Impaired balance, Decreased mobility, Impaired hearing, Orthopedic restrictions, Pain  Rehab Prognosis: Good  Evaluation/Treatment Tolerance: Patient limited by pain, Patient limited by fatigue  End of Session Communication: Bedside nurse  Assessment Comment: Pleasant 83 y.o presents with impaired mobility and weakness s/p fractur of L iliac bone. Pt. Lives alone and is normally Angus with 4 prong cane and currently requires minAx2 (anticipate modA x 1) with cuco walker. Recommend additional PT to address above noted limitations and prevent further decline.  End of Session Patient Position: Up in chair, Alarm on  IP OR SWING BED PT PLAN  Inpatient or Swing Bed: Inpatient  PT Plan  Treatment/Interventions: Bed mobility, Transfer training, Gait training, Balance training, Neuromuscular re-education, Strengthening, Endurance training, Therapeutic exercise, Therapeutic activity, Home exercise program  PT Plan: Skilled PT  PT Frequency: 4 times per week  PT Discharge Recommendations: Moderate intensity level of continued care  PT Recommended Transfer Status: Assist x2 (or modA x1 with gait belt)  PT - OK to Discharge: Yes Based on completed evaluation and care plan recommendations, no barriers to discharge to next site of care        Subjective   General Visit Information:  General  Reason for Referral: fracture R pelvis, impaired mobility  Referred By: Polina Trent, APRN-CNP  Past Medical History Relevant to Rehab: CP  Co-Treatment: OT  Prior to Session Communication: Bedside nurse  General Comment: IV,[place] (has hearing aides-not in), laceration R shoulder, R hand contracture, WBAT with walker  Home  Living:  Home Living  Type of Home: Mobile home  Lives With: Alone  Home Adaptive Equipment: Quad cane, Reacher, Cane  Home Layout: Multi-level  Home Access: Stairs to enter with rails  Entrance Stairs-Rails:  (both sides (only able to use one 2/2 R hand contracture))  Entrance Stairs-Number of Steps: 4 (1 + 1 steps inside home (no rails))  Bathroom Shower/Tub: Walk-in shower  Bathroom Equipment:  (suction grab bars in shower (fell off which is why she fell))  Prior Level of Function:  Prior Function Per Pt/Caregiver Report  Level of [place]: Independent with ADLs and functional transfers (has  1x a month, has groceries delivered.)  Ambulatory Assistance: Independent (with 4 prong cane primarily. Uses cuco cane in bad weather.)  Hand Dominance: Left  Precautions:  Precautions  LE Weight Bearing Status: Weight Bearing as Tolerated  Medical Precautions: Fall precautions  Vital Signs:  Vital Signs  Heart Rate: 75 (79 post session)  SpO2: (!) 88 % (quick recovery into 90s. 96% post session)    Objective   Pain:  Pain Assessment  Pain Assessment: 0-10  Pain Score: 5 - Moderate pain  Pain Type: Acute pain  Pain Location: Hip  Pain Orientation: Right  Cognition:  Cognition  Overall Cognitive Status: Within Functional Limits    General Assessments:        Activity Tolerance  Endurance: Decreased tolerance for upright activites (2/2 pain)    Sensation  Sensation Comment: denies sensation deficits    Coordination  Movements are Fluid and Coordinated: Yes (R hand contracture (at baseline))    Static Sitting Balance  Static Sitting-Comment/Number of Minutes: good  Dynamic Sitting Balance  Dynamic Sitting-Comments: good    Static Standing Balance  Static Standing-Comment/Number of Minutes: fair; with cuco walker  Dynamic Standing Balance  Dynamic Standing-Comments: fair; with cuco walker  Functional Assessments:  Bed Mobility  Bed Mobility: Yes  Bed Mobility 1  Bed Mobility 1: Supine to sitting, Rolling  right  Level of Assistance 1: Minimum assistance  Bed Mobility Comments 1: with verbal cues    Transfers  Transfer: Yes  Transfer 1  Transfer Device 1: Les-walker  Transfer Level of Assistance 1:  ([place] x2)  Trials/Comments 1: x2    Ambulation/Gait Training  Ambulation/Gait Training Performed: Yes  Ambulation/Gait Training 1  Device 1: Les Walker  Assistance 1:  ([place] x 2)  Quality of Gait 1: Narrow base of support, Antalgic, Knee(s) buckle  Comments/Distance (ft) 1: 3 steps to chair  Extremity/Trunk Assessments:  R hand contracture. See OT for further details   BLE: ROM WFL  MMT: >=3+/5. Did not test MMT 2/2 pain in hip.   Outcome Measures:  [facility] Basic Mobility  Turning from your back to your side while in a flat bed without using bedrails: A little Moving from lying on your back to sitting on the side of a flat bed without using bedrails: A little   Moving to and from bed to chair (including a wheelchair): A lot Standing up from a chair using your arms (e.g. wheelchair or bedside chair): To walk in hospital room: A lot    Climbing 3-5 steps with railing: A lot   Basic Mobility - Total Score: 14   Encounter Problems       Encounter Problems (Active)       Balance       STG - Maintains dynamic standing balance with 1 UE upper extremity support with good balance        Start:  12/04/23    Expected End:  12/18/23               Mobility       LTG - Patient will ambulate household distance Angus with les walker        Start:  12/04/23    Expected End:  12/18/23               Pain - Adult          Transfers       STG - Patient will perform bed mobility IND       Start:  12/04/23    Expected End:  12/18/23            STG - Patient will transfer sit to and from stand Angus with les walker        Start:  12/04/23    Expected End:  12/18/23                   Education Documentation  Mobility Training, taught by Chio Kurtz, PT at 12/4/2023  9:43 AM.  Learner: Patient  Readiness: Acceptance  Method:  Explanation  Response: Verbalizes Understanding  Comment: Educated pt. on PT POC and d/c recommendations

## 2023-12-04 NOTE — CONSULTS
Reason For Consult  Right pelvic fracture    History Of Present Illness  Polina Wilcox is a 83 y.o. female presenting with right pelvic fracture.  She presented to [facility] from home.  She lives independently and uses a 4 pronged cane at baseline occasionally walker when needed.  She fell in her shower and use life alert to get to the ED.  X-rays of the hip revealed right iliac wing fracture.  CT was ordered to further characterize.  She states her pain is 1 out of 10 at rest and 6 out of 10 when transferring to chair.  She denies any numbness or tingling in the hip area or distally into her lower leg.  She has some baseline musculoskeletal deficits in her feet bilaterally that are chronic.     Past Medical History  She has a past medical history of Anxiety and depression, Depression, and Hyperlipidemia.    Surgical History  She has no past surgical history on file.     Social History  She reports that she has never smoked. She has never used smokeless tobacco. She reports that she does not use drugs. No history on file for alcohol use.    Family History  No family history on file.     Allergies  Patient has no known allergies.    Review of Systems  A complete review of systems was conducted, pertinent only to the HPI noted above.     Constitutional: None     Eyes: No additions to above history     Ears, Nose, Throat: No additions to above history     Cardiovascular: No additions to above history     Respiratory: No additions to above history     GI: No additions to above history     : No additions to above history     Skin/Neuro: No additions to above history     Endocrine/Heme/Lymph: No additions to above history     Immunologic: No additions to above history     Psychiatric: No additions to above history     Musculoskeletal: see above       Physical Exam  General: Well developed, awake/alert/oriented x3, no distress, alert and cooperative.    Skin: Warm and dry    Eyes: EOMI    Head/neck: No apparent  "injury    Cardiac: Palpable pulses    Respiratory: Normal rise and fall of chest    GI: No obvious abdominal distension    Extremities: No edema, right foot with crossover toe deformity, left foot with large bunion    Neuro: AxOx3    Psych: Appropriate mood    Focused Musculoskeletal:  Right pelvis: No obvious deformity.  TTP over the fracture site right iliac bone.  No tenderness over the femoral acetabular joint, greater trochanter, distal femur or proximal tibia.  No tenderness over the SI joint.  ROM of the hip joint limited due to referred pain.  Distally she has good strength with knee flexion and extension 4/5.  She has limited dorsiflexion of the ankle which is her baseline, plantarflexion intact.  Sensation intact to light touch in all distributions from the waist down and equal bilaterally.  2+ dorsalis pedis and posterior tibial pulses.     Last Recorded Vitals  Blood pressure 125/51, pulse 75, temperature 36.2 °C (97.2 °F), temperature source Temporal, resp. rate 17, height 1.549 m (5' 1\"), weight (!) 38.4 kg (84 lb 10.5 oz), SpO2 (!) 88 %.        CT pelvis wo IV contrast    Result Date: 12/3/2023  Interpreted By:  Kartik Schilling, STUDY: CT PELVIS WO IV CONTRAST; 12/3/2023 3:23 pm   INDICATION: Signs/Symptoms:right hip and pelvic pain post fall.   COMPARISON: None.   ACCESSION NUMBER(S): [id number]   ORDERING CLINICIAN: SEB GREEN   TECHNIQUE: Contiguous axial CT images were obtained through the pelvis at 3 mm slice thickness without contrast administration. The images were then reconstructed in the coronal and sagittal planes.   FINDINGS: BONES: There is a comminuted fracture identified involving the anterior aspect of right pelvic wing. No additional fracture is seen.   Mild degenerative changes are seen in the sacroiliac joints bilaterally. The hips are grossly well-preserved.   SOFT TISSUES: No definite masses or abnormal fluid collections are identified.   No definite hernias are identified.   " PERITONEUM: Evaluation of the visualized soft tissues of the abdomen is limited by the lack of intravenous contrast. Within this limitation, no gross mass or lymphadenopathy is identified.       1. Right pelvic fracture, as above.   MACRO: None.   Signed by: Kartik Schilling 12/3/2023 3:50 PM Dictation workstation:   [id number]    XR hip right with pelvis when performed 2 or 3 views    Result Date: 12/3/2023  Interpreted By:  Luzmaria Ag, STUDY: XR HIP RIGHT WITH PELVIS WHEN PERFORMED 2 OR 3 VIEWS; XR FEMUR RIGHT 2+ VIEWS;  12/3/2023 2:23 pm   INDICATION: Signs/Symptoms:right hip pain after fall in shower this morning; Signs/Symptoms:right femur pain after a fall in the shower this morning.   COMPARISON: None.   ACCESSION NUMBER(S): [id number]; [id number]   ORDERING CLINICIAN: SEB GREEN   FINDINGS: There is a comminuted displaced fracture of the right iliac bone. The bilateral hip joints are well preserved. No dislocation is noted.   Calcific densities are noted in the right proximal tibia, which may be related to bone infarct or enchondroma.       Comminuted displaced fracture of the right iliac bone. Further evaluation with pelvic CT is suggested.   No definite bilateral hip fracture or right knee fracture.   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     MACRO: None   Signed by: Luzmaria Ag 12/3/2023 2:27 PM Dictation workstation:   [id number]    XR femur right 2+ views    Result Date: 12/3/2023  Interpreted By:  Luzmaria Ag, STUDY: XR HIP RIGHT WITH PELVIS WHEN PERFORMED 2 OR 3 VIEWS; XR FEMUR RIGHT 2+ VIEWS;  12/3/2023 2:23 pm   INDICATION: Signs/Symptoms:right hip pain after fall in shower this morning; Signs/Symptoms:right femur pain after a fall in the shower this morning.   COMPARISON: None.   ACCESSION NUMBER(S): [id number]; [id number]   ORDERING CLINICIAN: SEB GREEN   FINDINGS: There is a comminuted displaced fracture of the right iliac bone. The bilateral  hip joints are well preserved. No dislocation is noted.   Calcific densities are noted in the right proximal tibia, which may be related to bone infarct or enchondroma.       Comminuted displaced fracture of the right iliac bone. Further evaluation with pelvic CT is suggested.   No definite bilateral hip fracture or right knee fracture.   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     MACRO: None   Signed by: Luzmaria Ag 12/3/2023 2:27 PM Dictation workstation:   [id number]       Assessment/Plan     Right iliac fracture with mild displacement    83-year-old female sustained the above fracture after a fall in the shower yesterday.  On exam she is neurovascularly intact.  I discussed the case with the on-call orthopedic physician at [facility].  We will proceed to treat this nonoperatively.    -WBAT with walker  -ice packs to right pelvis  -pain control  -PT  -follow-up in ortho office 6-8 weeks    Addendum: calcific densities found in the proximal tibia will be followed for radiographic stability.       I spent 30 minutes in the professional and overall care of this patient.      Herberth Ching PA-C

## 2023-12-04 NOTE — CARE PLAN
Problem: Skin  Goal: Participates in plan/prevention/treatment measures  Outcome: Progressing  Goal: Prevent/manage excess moisture  Outcome: Progressing  Goal: Prevent/minimize sheer/friction injuries  Outcome: Progressing  Goal: Promote/optimize nutrition  Outcome: Progressing  Goal: Promote skin healing  Outcome: Progressing     Problem: Pain  Goal: Turns in bed with improved pain control throughout the shift  Outcome: Progressing  Goal: Walks with improved pain control throughout the shift  Outcome: Progressing  Goal: Performs ADL's with improved pain control throughout shift  Outcome: Progressing  Goal: Participates in PT with improved pain control throughout the shift  Outcome: Progressing  Goal: Free from opioid side effects throughout the shift  Outcome: Progressing  Goal: Free from acute confusion related to pain meds throughout the shift  Outcome: Progressing     Problem: Fall/Injury  Goal: Verbalize understanding of personal risk factors for fall in the hospital  Outcome: Progressing  Goal: Verbalize understanding of risk factor reduction measures to prevent injury from fall in the home  Outcome: Progressing  Goal: Use assistive devices by end of the shift  Outcome: Progressing  Goal: Pace activities to prevent fatigue by end of the shift  Outcome: Progressing   The patient's goals for the shift include      The clinical goals for the shift include free from falls/ injury this shift      IVF maintained.  Up to chair/ worked with therapy. Scheduled Tylenol given.  Accepted @ [facility], antic discharge 12/6.

## 2023-12-04 NOTE — PROGRESS NOTES
Occupational Therapy    Evaluation/Treatment    Patient Name: Polina Wilcox  MRN: [id number]  : 1940  Today's Date: 23  Time Calculation  Start Time: 0850  Stop Time: 920  Time Calculation (min): 30 min    Assessment:  OT Assessment: Pt. is a 83 y.o. female referred to OT for self care d/t admission for fracture of R pelvis. Pt. displays decreased mobility, balance, transfers, and self-care. Pt. would benefit from OT to address.  Prognosis: Good  Barriers to Discharge: None  Evaluation/Treatment Tolerance: Patient tolerated treatment well  Medical Staff Made Aware: Yes  End of Session Communication: Bedside nurse  End of Session Patient Position: Up in chair, Alarm on  Prognosis: Good  Barriers to Discharge: None  Evaluation/Treatment Tolerance: Patient tolerated treatment well  Medical Staff Made Aware: Yes  Plan:  Treatment Interventions: ADL retraining, Functional transfer training, UE strengthening/ROM, Endurance training, Patient/family training, Neuromuscular reeducation, Equipment evaluation/education, Compensatory technique education  OT Frequency: 4 times per week  OT Discharge Recommendations: Moderate intensity level of continued care  OT Recommended Transfer Status: Moderate assist, Assist of 1  OT - OK to Discharge: Yes (Based on completed evaluation and care plan recommendations, no barriers to discharge to next site of care)  Treatment Interventions: ADL retraining, Functional transfer training, UE strengthening/ROM, Endurance training, Patient/family training, Neuromuscular reeducation, Equipment evaluation/education, Compensatory technique education    Subjective   Current Problem:  1. Fracture of right pelvis, closed, initial encounter (CMS/[facility])          General:   OT Received On: 23  General  Reason for Referral: impaired self-care d/t admission for fracture of  R pelvis  Referred By: LEIGHANN Chavez-CNP  Past Medical History Relevant to Rehab: CP, anxiety,  depression, HLD  Family/Caregiver Present: No  Co-Treatment: PT (partial co eval. PT entered during OT assessment)  Prior to Session Communication: Bedside nurse  Patient Position Received: Bed, 2 rail up, Alarm on  General Comment: IV; [place] (has hearing aides, need charged)  Precautions:  LE Weight Bearing Status: Weight Bearing as Tolerated  Medical Precautions: Fall precautions  Vital Signs:  Heart Rate: 55  Heart Rate Source: Monitor  SpO2: 100 %  Patient Position: Lying  Pain:  Pain Assessment  Pain Assessment: 0-10  Pain Score: 5 - Moderate pain  Pain Type: Acute pain  Pain Location: Hip  Pain Orientation: Right  Pain Interventions: Repositioned    Objective   Cognition:  Overall Cognitive Status: Within Functional Limits  Home Living:  Type of Home: Mobile home  Lives With: Alone  Home Adaptive Equipment: Quad cane, Reacher, Cane (4 prong cane)  Home Layout: Multi-level  Home Access: Stairs to enter with rails  Entrance Stairs-Rails: Both (both sides (only able to use one 2/2 R hand contracture))  Entrance Stairs-Number of Steps: 4  Bathroom Shower/Tub: Walk-in shower  Bathroom Toilet: Standard  Bathroom Equipment: Grab bars in shower (suction grab bars in shower (fell off which is why she fell))  Prior Function:  Level of [place]: Independent with ADLs and functional transfers (has  1x a month, has groceries delivered.)  Receives Help From: Friends  ADL Assistance: Independent  Homemaking Assistance: Independent (has a  1x/month)  Ambulatory Assistance: Independent (with 4 prong cane primarily. Uses cuco cane in bad weather.)  Hand Dominance: Left  IADL History:  Homemaking Responsibilities: Yes  ADL:  Eating Assistance: Independent  Grooming Assistance: Independent  Bathing Assistance: Moderate (anticiapted)  UE Dressing Assistance: Minimal (anticipated)  LE Dressing Assistance: Moderate (assistance with reacher use; and donning shoes)  LE Dressing Deficit: Don/doff R shoe,  Don/doff L shoe, Increased time to complete, Setup, Use of adaptive equipment  Toileting Assistance with Device: Moderate (anticipated)  Toileting Deficit: Grab bar use, Increased time to complete, Clothing management up, Clothing management down, Perineal hygiene  Activities of Daily Living: LE Dressing  LE Dressing: Yes  LE Dressing Adaptive Equipment: Reacher  Shoe Level of Assistance: Dependent  Adult Briefs Level of Assistance: Moderate assistance, Minimal verbal cues  LE Dressing Where Assessed: Edge of bed  LE Dressing Comments: Pt. able to follow directions of AE use for LB dressing. Pt. able to thread BLE with minimal dificulty. Unable to pull pants over hips d/t poor balance and weakness in RLE. Pt. would benefit from continued AE use.  Activity Tolerance:  Endurance: Decreased tolerance for upright activites  Bed Mobility/Transfers: Bed Mobility  Bed Mobility: Yes  Bed Mobility 1  Bed Mobility 1: Rolling right, Supine to sitting  Level of Assistance 1: Minimum assistance, Minimal verbal cues  Bed Mobility Comments 1: verbal cueing for log rolls, assistance for guidance and LE mobility    Transfers  Transfer: Yes  Transfer 1  Transfer From 1: Bed to  Transfer to 1: Chair with arms  Technique 1: Sit to stand, Stand to sit  Transfer Device 1: Les-walker  Transfer Level of Assistance 1: Minimum assistance, +2  Trials/Comments 1: x2  Ambulation/Gait Training:  Ambulation/Gait Training  Ambulation/Gait Training Performed: Yes  Ambulation/Gait Training 1  Surface 1: Level tile  Device 1: Les Walker  Assistance 1: Minimum assistance (anticpate MOD A x1; MIN A x2 today with buckling)  Sitting Balance:  Static Sitting Balance  Static Sitting-Balance Support: Feet supported  Static Sitting-Level of Assistance: Independent  Dynamic Sitting Balance  Dynamic Sitting-Balance Support: Feet supported  Dynamic Sitting-Comments: contact guard assist; verbal cueing for precautions  Standing Balance:  Static Standing  Balance  Static Standing-Balance Support: Left upper extremity supported  Static Standing-Level of Assistance: Minimum assistance  Dynamic Standing Balance  Dynamic Standing-Balance Support: Left upper extremity supported  Dynamic Standing-Comments: MIN A x2  Sensation:  Sensation Comment: denies sensation deficits  Strength:  Strength Comments: LUE: grossly 5/5  Coordination:  Movements are Fluid and Coordinated: Yes (R hand contracture at baseline)   Hand Function:  Hand Function  Gross Grasp: Functional (L handed)  Coordination: Functional (L handed)  Extremities: RUE   RUE : Exceptions to WFL (RUE contracture d/t CP) and LUE   LUE: Within Functional Limits    Outcome Measures: [facility] Daily Activity  Putting on and taking off regular lower body clothing: A lot  Bathing (including washing, rinsing, drying): A lot  Putting on and taking off regular upper body clothing: A little  Toileting, which includes using toilet, bedpan or urinal: A lot  Taking care of personal grooming such as brushing teeth: None  Eating Meals: None  Daily Activity - Total Score: 17        Education Documentation  Body Mechanics, taught by Rimma Nazario OT at 12/4/2023 10:42 AM.  Learner: Patient  Readiness: Acceptance  Method: Explanation  Response: Needs Reinforcement  Comment: edu on AE use, POC/DC rec, safety with transfers.    Precautions, taught by Rimma Nazario OT at 12/4/2023 10:42 AM.  Learner: Patient  Readiness: Acceptance  Method: Explanation  Response: Needs Reinforcement  Comment: edu on AE use, POC/DC rec, safety with transfers.    ADL Training, taught by Rimma Nazario OT at 12/4/2023 10:42 AM.  Learner: Patient  Readiness: Acceptance  Method: Explanation  Response: Needs Reinforcement  Comment: edu on AE use, POC/DC rec, safety with transfers.    Education Comments  No comments found.      Goals:  Encounter Problems       Encounter Problems (Active)       ADLs       Patient will perform UB and LB bathing with minimal assist   level of assistance and shower chair, extended tub bench, and long-handled sponge.       Start:  12/04/23    Expected End:  12/18/23            Patient with complete lower body dressing with contact guard assist level of assistance donning and doffing all LE clothes  with PRN adaptive equipment while edge of bed        Start:  12/04/23    Expected End:  12/18/23            Patient will complete toileting including hygiene clothing management/hygiene with CGA and raised toilet seat, grab bars, and bedside commode.       Start:  12/04/23    Expected End:  12/18/23               BALANCE       Patient will tolerate standing for 4 minutes to stand by assist level of assistance with least restrictive device in order to improve functional activity tolerance for ADL tasks.       Start:  12/04/23    Expected End:  12/18/23               TRANSFERS       Patient will complete sit to stand transfer with stand by assist level of assistance and least restrictive device in order to improve safety and prepare for out of bed mobility.       Start:  12/04/23    Expected End:  12/18/23

## 2023-12-04 NOTE — CARE PLAN
The clinical goals for the shift include pain control 5 or less on scale of 1-10.  Patients pain level remained 5 or less using tylenol for pain. Fairly comfortable when still, hurts to move. Tolerated bedpan well, log roll with pillow between knees. Pedal pulses intact and equal. Provider contacted for lack of urine output this shift.     0440 Patient was admitted to med /surg just before night shift and she had not voided all shift. Right now she requested the bedpan and voided only 30cc. Bladder scan showed 172. She does have fluids running since 2230 and urine was medium yellow in color, not lucy.  - No further orders ,instructed to be sure day shift is aware.

## 2023-12-04 NOTE — PROGRESS NOTES
"Polina Wilcox is a 83 y.o. female on day 1 of admission presenting with Fracture of right pelvis, closed, initial encounter (CMS/[facility]).    Subjective     Patient had 30 ml UOP overnight; remains on IVF.     Patient resting comfortably this morning, reports that her pain is controlled. She remains on room air and has had good oral intake. Will continue IVF for poor urine output and elevated CK. Orthopedics is consulted to see the patient today. Plan of care discussed with patient, all questions answered.        Objective     Physical Exam  Constitutional: WN/WD female sitting up in bed,  in no acute distress   Head/Neck: NC/AT   EENT: Clear sclera.   Respiratory/Thorax: Clear to auscultation bilaterally; no wheezing, rhonchi, or rales. No accessory muscle use or conversational dyspnea. SpO2 > 92% on room air   CV: Regular rate and rhythm, no murmurs. Normal S1 and S2.   GI: Soft, non-distended, non-tender abdomen. No masses or organomegaly. + BS   MSK/Extremities: Grossly normal extremities, no edema. Right hip and lateral thigh tender to palpation, no bony abnormalities noted. Able to wiggle toes of right foot. + DP pulses  Neuro: Alert and oriented x3  Derm:  Warm and dry, no lesions or rashes  Psych: Appropriate mood and behavior      Last Recorded Vitals  Blood pressure 125/51, pulse 75, temperature 36.2 °C (97.2 °F), temperature source Temporal, resp. rate 17, height 1.549 m (5' 1\"), weight (!) 38.4 kg (84 lb 10.5 oz), SpO2 (!) 88 %.  Intake/Output last 3 Shifts:  I/O last 3 completed shifts:  In: 1727 (45 mL/kg) [P.O.:480; I.V.:747 (19.5 mL/kg); IV Piggyback:500]  Out: 30 (0.8 mL/kg) [Urine:30 (0 mL/kg/hr)]  Weight: 38.4 kg     Relevant Results        Scheduled medications  acetaminophen, 975 mg, oral, q8h  heparin (porcine), 5,000 Units, subcutaneous, q8h  pantoprazole, 40 mg, oral, Daily before breakfast   Or  pantoprazole, 40 mg, intravenous, Daily before breakfast  pravastatin, 40 mg, oral, " Nightly  sennosides, 2 tablet, oral, BID  sertraline, 150 mg, oral, Daily      Continuous medications  sodium chloride 0.9%, 100 mL/hr, Last Rate: 100 mL/hr (12/04/23 1054)      PRN medications  PRN medications: ondansetron **OR** ondansetron, oxyCODONE, polyethylene glycol    Results for orders placed or performed during the hospital encounter of 12/03/23 (from the past 24 hour(s))   Light Blue Top   Result Value Ref Range    Extra Tube Hold for add-ons.    PST Top   Result Value Ref Range    Extra Tube Hold for add-ons.    Lavender Top   Result Value Ref Range    Extra Tube Hold for add-ons.    SST TOP   Result Value Ref Range    Extra Tube Hold for add-ons.    Gray Top   Result Value Ref Range    Extra Tube Hold for add-ons.    CBC and Auto Differential   Result Value Ref Range    WBC 10.2 4.4 - 11.3 x10*3/uL    nRBC 0.0 0.0 - 0.0 /100 WBCs    RBC 3.96 (L) 4.00 - 5.20 x10*6/uL    Hemoglobin 10.7 (L) 12.0 - 16.0 g/dL    Hematocrit 35.6 (L) 36.0 - 46.0 %    MCV 90 80 - 100 fL    MCH 27.0 26.0 - 34.0 pg    MCHC 30.1 (L) 32.0 - 36.0 g/dL    RDW 15.0 (H) 11.5 - 14.5 %    Platelets 364 150 - 450 x10*3/uL    Neutrophils % 76.5 40.0 - 80.0 %    Immature Granulocytes %, Automated 0.5 0.0 - 0.9 %    Lymphocytes % 15.7 13.0 - 44.0 %    Monocytes % 4.7 2.0 - 10.0 %    Eosinophils % 2.0 0.0 - 6.0 %    Basophils % 0.6 0.0 - 2.0 %    Neutrophils Absolute 7.84 (H) 1.60 - 5.50 x10*3/uL    Immature Granulocytes Absolute, Automated 0.05 0.00 - 0.50 x10*3/uL    Lymphocytes Absolute 1.61 0.80 - 3.00 x10*3/uL    Monocytes Absolute 0.48 0.05 - 0.80 x10*3/uL    Eosinophils Absolute 0.20 0.00 - 0.40 x10*3/uL    Basophils Absolute 0.06 0.00 - 0.10 x10*3/uL   Comprehensive metabolic panel   Result Value Ref Range    Glucose 162 (H) 74 - 99 mg/dL    Sodium 141 136 - 145 mmol/L    Potassium 4.1 3.5 - 5.3 mmol/L    Chloride 103 98 - 107 mmol/L    Bicarbonate 27 21 - 32 mmol/L    Anion Gap 15 10 - 20 mmol/L    Urea Nitrogen 29 (H) 6 - 23 mg/dL     Creatinine 1.25 (H) 0.50 - 1.05 mg/dL    eGFR 43 (L) >60 mL/min/1.73m*2    Calcium 9.4 8.6 - 10.3 mg/dL    Albumin 3.9 3.4 - 5.0 g/dL    Alkaline Phosphatase 46 33 - 136 U/L    Total Protein 7.2 6.4 - 8.2 g/dL    AST 15 9 - 39 U/L    Bilirubin, Total 0.2 0.0 - 1.2 mg/dL    ALT 7 7 - 45 U/L   Lactate   Result Value Ref Range    Lactate 2.8 (H) 0.4 - 2.0 mmol/L   Lactate   Result Value Ref Range    Lactate 2.3 (H) 0.4 - 2.0 mmol/L   Urinalysis with Reflex Microscopic   Result Value Ref Range    Color, Urine Yellow Straw, Yellow    Appearance, Urine Clear Clear    Specific Gravity, Urine 1.026 1.005 - 1.035    pH, Urine 5.0 5.0, 5.5, 6.0, 6.5, 7.0, 7.5, 8.0    Protein, Urine 30 (1+) (N) NEGATIVE mg/dL    Glucose, Urine NEGATIVE NEGATIVE mg/dL    Blood, Urine NEGATIVE NEGATIVE    Ketones, Urine 5 (TRACE) (A) NEGATIVE mg/dL    Bilirubin, Urine NEGATIVE NEGATIVE    Urobilinogen, Urine <2.0 <2.0 mg/dL    Nitrite, Urine NEGATIVE NEGATIVE    Leukocyte Esterase, Urine NEGATIVE NEGATIVE   Microscopic Only, Urine   Result Value Ref Range    WBC, Urine 1-5 1-5, NONE /HPF    RBC, Urine NONE NONE, 1-2, 3-5 /HPF    Squamous Epithelial Cells, Urine 1-9 (SPARSE) Reference range not established. /HPF   CBC   Result Value Ref Range    WBC 5.2 4.4 - 11.3 x10*3/uL    nRBC 0.0 0.0 - 0.0 /100 WBCs    RBC 3.50 (L) 4.00 - 5.20 x10*6/uL    Hemoglobin 9.5 (L) 12.0 - 16.0 g/dL    Hematocrit 31.5 (L) 36.0 - 46.0 %    MCV 90 80 - 100 fL    MCH 27.1 26.0 - 34.0 pg    MCHC 30.2 (L) 32.0 - 36.0 g/dL    RDW 14.8 (H) 11.5 - 14.5 %    Platelets 233 150 - 450 x10*3/uL   Basic metabolic panel   Result Value Ref Range    Glucose 104 (H) 74 - 99 mg/dL    Sodium 141 136 - 145 mmol/L    Potassium 3.9 3.5 - 5.3 mmol/L    Chloride 109 (H) 98 - 107 mmol/L    Bicarbonate 26 21 - 32 mmol/L    Anion Gap 10 10 - 20 mmol/L    Urea Nitrogen 29 (H) 6 - 23 mg/dL    Creatinine 1.14 (H) 0.50 - 1.05 mg/dL    eGFR 48 (L) >60 mL/min/1.73m*2    Calcium 8.5 (L) 8.6 - 10.3  mg/dL   Creatine Kinase   Result Value Ref Range    Creatine Kinase 483 (H) 0 - 215 U/L   Lactate   Result Value Ref Range    Lactate 2.0 0.4 - 2.0 mmol/L     CT pelvis wo IV contrast    Result Date: 12/3/2023  Interpreted By:  Kartik Schilling, STUDY: CT PELVIS WO IV CONTRAST; 12/3/2023 3:23 pm   INDICATION: Signs/Symptoms:right hip and pelvic pain post fall.   COMPARISON: None.   ACCESSION NUMBER(S): [id number]   ORDERING CLINICIAN: SEB GREEN   TECHNIQUE: Contiguous axial CT images were obtained through the pelvis at 3 mm slice thickness without contrast administration. The images were then reconstructed in the coronal and sagittal planes.   FINDINGS: BONES: There is a comminuted fracture identified involving the anterior aspect of right pelvic wing. No additional fracture is seen.   Mild degenerative changes are seen in the sacroiliac joints bilaterally. The hips are grossly well-preserved.   SOFT TISSUES: No definite masses or abnormal fluid collections are identified.   No definite hernias are identified.   PERITONEUM: Evaluation of the visualized soft tissues of the abdomen is limited by the lack of intravenous contrast. Within this limitation, no gross mass or lymphadenopathy is identified.       1. Right pelvic fracture, as above.   MACRO: None.   Signed by: Kartik Schilling 12/3/2023 3:50 PM Dictation workstation:   [id number]    XR hip right with pelvis when performed 2 or 3 views    Result Date: 12/3/2023  Interpreted By:  Luzmaria Ag, STUDY: XR HIP RIGHT WITH PELVIS WHEN PERFORMED 2 OR 3 VIEWS; XR FEMUR RIGHT 2+ VIEWS;  12/3/2023 2:23 pm   INDICATION: Signs/Symptoms:right hip pain after fall in shower this morning; Signs/Symptoms:right femur pain after a fall in the shower this morning.   COMPARISON: None.   ACCESSION NUMBER(S): [id number]; [id number]   ORDERING CLINICIAN: SEB GREEN   FINDINGS: There is a comminuted displaced fracture of the right iliac bone. The bilateral hip joints are well  preserved. No dislocation is noted.   Calcific densities are noted in the right proximal tibia, which may be related to bone infarct or enchondroma.       Comminuted displaced fracture of the right iliac bone. Further evaluation with pelvic CT is suggested.   No definite bilateral hip fracture or right knee fracture.   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     MACRO: None   Signed by: Luzmaria Ag 12/3/2023 2:27 PM Dictation workstation:   [id number]    XR femur right 2+ views    Result Date: 12/3/2023  Interpreted By:  Luzmaria Ag, STUDY: XR HIP RIGHT WITH PELVIS WHEN PERFORMED 2 OR 3 VIEWS; XR FEMUR RIGHT 2+ VIEWS;  12/3/2023 2:23 pm   INDICATION: Signs/Symptoms:right hip pain after fall in shower this morning; Signs/Symptoms:right femur pain after a fall in the shower this morning.   COMPARISON: None.   ACCESSION NUMBER(S): [id number]; [id number]   ORDERING CLINICIAN: SEB GREEN   FINDINGS: There is a comminuted displaced fracture of the right iliac bone. The bilateral hip joints are well preserved. No dislocation is noted.   Calcific densities are noted in the right proximal tibia, which may be related to bone infarct or enchondroma.       Comminuted displaced fracture of the right iliac bone. Further evaluation with pelvic CT is suggested.   No definite bilateral hip fracture or right knee fracture.   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     MACRO: None   Signed by: Luzmaria Ag 12/3/2023 2:27 PM Dictation workstation:   [id number]            Assessment/Plan   Principal Problem:    Fracture of right pelvis, closed, initial encounter (CMS/[facility])    #Right pelvic fracture s/p mechanical fall  #Rhabdomyolysis  #DUSTY (improving)   #Lactic acidosis (resolved)   - Patient slipped and fell in her shower; denies hitting her head or loss of consciousness. Not on AC   - XR: Comminuted displaced fracture of the right iliac bone. Further    evaluation with pelvic CT is suggested. No definite bilateral hip fracture or right knee fracture. 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   - CT pelvis w/o contrast: There is a comminuted fracture identified involving the anterior aspect of right pelvic wing. No additional fracture is seen.   -   - Lactate 2.8 > 2.3 > 2.0  - Cr 1.25 > 1.14  - Continue NS at 100 ml/hr until CK WNL; received a total of 1500 ml NS in the ED   - Avoid nephrotoxic meds/renally dose as needed  - Trend CK, BMP daily   - monitor UOP   - Tylenol 975 mg PO q8h scheduled- pain currently controlled  - Oxycodone 5 mg PO q6h PRN for severe pain 7-10  - Senokot for prevention of constipation while on opiates   - PT/OT evals- recommending moderate intensity therapy on discharge   - TCC for discharge planning/SNF placement- patient from home alone   - Orthopedics consulted, appreciate recs     #HLD  - Continue pravastatin     #Anxiety    - Continue sertraline    DVT PPx: Heparin   GI PPx: Protonix   Diet: Regular   Code Status: Full code     Disposition: Patient requires inpatient management at this time.     Total accumulated time spent face to face and not face to face preparing to see the patient, obtaining and reviewing separately obtained history; performing a medically appropriate examination and/or evaluation; counseling and educating the patient, family; ordering medications, tests, or procedures; referring and communicating with other health care professionals; documenting clinical information in the patient's medical record; independently interpreting results and communicating the results to the patient, family; and care coordination was 45 minutes.       Sylvie Aaron PA-C

## 2023-12-04 NOTE — CARE PLAN
Problem: Skin  Goal: Participates in plan/prevention/treatment measures  12/4/2023 1808 by Leatha Estrada RN  Flowsheets (Taken 12/4/2023 1808)  Participates in plan/prevention/treatment measures: Increase activity/out of bed for meals  12/4/2023 1736 by Leatha Estrada RN  Outcome: Progressing     Problem: Skin  Goal: Prevent/manage excess moisture  Outcome: Progressing     Problem: Skin  Goal: Prevent/minimize sheer/friction injuries  Outcome: Progressing     Problem: Skin  Goal: Promote/optimize nutrition  Outcome: Progressing     Problem: Skin  Goal: Promote skin healing  Outcome: Progressing   The patient's goals for the shift include      The clinical goals for the shift include free from falls/ injury this shift    .

## 2023-12-05 LAB
ANION GAP SERPL CALC-SCNC: 11 MMOL/L (ref 10–20)
BUN SERPL-MCNC: 25 MG/DL (ref 6–23)
CALCIUM SERPL-MCNC: 7.9 MG/DL (ref 8.6–10.3)
CHLORIDE SERPL-SCNC: 115 MMOL/L (ref 98–107)
CK SERPL-CCNC: 360 U/L (ref 0–215)
CO2 SERPL-SCNC: 21 MMOL/L (ref 21–32)
CREAT SERPL-MCNC: 1.06 MG/DL (ref 0.5–1.05)
ERYTHROCYTE [DISTWIDTH] IN BLOOD BY AUTOMATED COUNT: 14.9 % (ref 11.5–14.5)
GFR SERPL CREATININE-BSD FRML MDRD: 53 ML/MIN/1.73M*2
GLUCOSE SERPL-MCNC: 97 MG/DL (ref 74–99)
HCT VFR BLD AUTO: 29.7 % (ref 36–46)
HGB BLD-MCNC: 8.7 G/DL (ref 12–16)
MCH RBC QN AUTO: 27 PG (ref 26–34)
MCHC RBC AUTO-ENTMCNC: 29.3 G/DL (ref 32–36)
MCV RBC AUTO: 92 FL (ref 80–100)
NRBC BLD-RTO: 0 /100 WBCS (ref 0–0)
PLATELET # BLD AUTO: 202 X10*3/UL (ref 150–450)
POTASSIUM SERPL-SCNC: 4.3 MMOL/L (ref 3.5–5.3)
RBC # BLD AUTO: 3.22 X10*6/UL (ref 4–5.2)
SODIUM SERPL-SCNC: 143 MMOL/L (ref 136–145)
WBC # BLD AUTO: 6.7 X10*3/UL (ref 4.4–11.3)

## 2023-12-05 PROCEDURE — 96372 THER/PROPH/DIAG INJ SC/IM: CPT | Mod: IPSPLIT | Performed by: NURSE PRACTITIONER

## 2023-12-05 PROCEDURE — 1100000001 HC PRIVATE ROOM DAILY: Mod: IPSPLIT

## 2023-12-05 PROCEDURE — 2500000001 HC RX 250 WO HCPCS SELF ADMINISTERED DRUGS (ALT 637 FOR MEDICARE OP): Mod: IPSPLIT | Performed by: NURSE PRACTITIONER

## 2023-12-05 PROCEDURE — 80048 BASIC METABOLIC PNL TOTAL CA: CPT | Mod: IPSPLIT | Performed by: NURSE PRACTITIONER

## 2023-12-05 PROCEDURE — 97535 SELF CARE MNGMENT TRAINING: CPT | Mod: GO,IPSPLIT

## 2023-12-05 PROCEDURE — 97110 THERAPEUTIC EXERCISES: CPT | Mod: GP,CQ,IPSPLIT

## 2023-12-05 PROCEDURE — 82550 ASSAY OF CK (CPK): CPT | Mod: IPSPLIT

## 2023-12-05 PROCEDURE — 2500000004 HC RX 250 GENERAL PHARMACY W/ HCPCS (ALT 636 FOR OP/ED): Mod: IPSPLIT | Performed by: NURSE PRACTITIONER

## 2023-12-05 PROCEDURE — 85027 COMPLETE CBC AUTOMATED: CPT | Mod: IPSPLIT | Performed by: NURSE PRACTITIONER

## 2023-12-05 PROCEDURE — 97116 GAIT TRAINING THERAPY: CPT | Mod: GP,CQ,IPSPLIT

## 2023-12-05 PROCEDURE — 99233 SBSQ HOSP IP/OBS HIGH 50: CPT | Performed by: NURSE PRACTITIONER

## 2023-12-05 PROCEDURE — 36415 COLL VENOUS BLD VENIPUNCTURE: CPT | Mod: IPSPLIT | Performed by: NURSE PRACTITIONER

## 2023-12-05 RX ADMIN — HEPARIN SODIUM 5000 UNITS: 5000 INJECTION INTRAVENOUS; SUBCUTANEOUS at 11:36

## 2023-12-05 RX ADMIN — ACETAMINOPHEN 975 MG: 325 TABLET ORAL at 14:17

## 2023-12-05 RX ADMIN — PRAVASTATIN SODIUM 40 MG: 20 TABLET ORAL at 20:40

## 2023-12-05 RX ADMIN — HEPARIN SODIUM 5000 UNITS: 5000 INJECTION INTRAVENOUS; SUBCUTANEOUS at 20:40

## 2023-12-05 RX ADMIN — PANTOPRAZOLE SODIUM 40 MG: 40 TABLET, DELAYED RELEASE ORAL at 07:52

## 2023-12-05 RX ADMIN — SODIUM CHLORIDE 100 ML/HR: 9 INJECTION, SOLUTION INTRAVENOUS at 00:35

## 2023-12-05 RX ADMIN — SERTRALINE HYDROCHLORIDE 150 MG: 50 TABLET ORAL at 07:53

## 2023-12-05 RX ADMIN — ACETAMINOPHEN 975 MG: 325 TABLET ORAL at 06:06

## 2023-12-05 RX ADMIN — ACETAMINOPHEN 975 MG: 325 TABLET ORAL at 22:24

## 2023-12-05 RX ADMIN — HEPARIN SODIUM 5000 UNITS: 5000 INJECTION INTRAVENOUS; SUBCUTANEOUS at 04:11

## 2023-12-05 ASSESSMENT — COGNITIVE AND FUNCTIONAL STATUS - GENERAL
MOBILITY SCORE: 13
EATING MEALS: A LITTLE
HELP NEEDED FOR BATHING: A LITTLE
EATING MEALS: A LITTLE
DRESSING REGULAR UPPER BODY CLOTHING: A LITTLE
PERSONAL GROOMING: A LITTLE
WALKING IN HOSPITAL ROOM: A LITTLE
PERSONAL GROOMING: A LITTLE
MOVING FROM LYING ON BACK TO SITTING ON SIDE OF FLAT BED WITH BEDRAILS: A LITTLE
CLIMB 3 TO 5 STEPS WITH RAILING: A LOT
DRESSING REGULAR UPPER BODY CLOTHING: A LITTLE
MOVING FROM LYING ON BACK TO SITTING ON SIDE OF FLAT BED WITH BEDRAILS: A LITTLE
STANDING UP FROM CHAIR USING ARMS: A LITTLE
TOILETING: A LITTLE
TURNING FROM BACK TO SIDE WHILE IN FLAT BAD: A LITTLE
CLIMB 3 TO 5 STEPS WITH RAILING: A LOT
STANDING UP FROM CHAIR USING ARMS: A LOT
CLIMB 3 TO 5 STEPS WITH RAILING: A LITTLE
MOBILITY SCORE: 18
MOBILITY SCORE: 18
DRESSING REGULAR UPPER BODY CLOTHING: A LITTLE
DAILY ACTIVITIY SCORE: 15
DAILY ACTIVITIY SCORE: 19
TURNING FROM BACK TO SIDE WHILE IN FLAT BAD: A LITTLE
WALKING IN HOSPITAL ROOM: A LITTLE
DRESSING REGULAR LOWER BODY CLOTHING: A LITTLE
PERSONAL GROOMING: A LITTLE
HELP NEEDED FOR BATHING: A LITTLE
MOVING TO AND FROM BED TO CHAIR: A LITTLE
WALKING IN HOSPITAL ROOM: A LOT
DRESSING REGULAR LOWER BODY CLOTHING: A LITTLE
MOVING TO AND FROM BED TO CHAIR: A LITTLE
HELP NEEDED FOR BATHING: A LOT
DAILY ACTIVITIY SCORE: 18
DRESSING REGULAR LOWER BODY CLOTHING: A LOT
MOVING TO AND FROM BED TO CHAIR: A LOT
STANDING UP FROM CHAIR USING ARMS: A LITTLE
TOILETING: A LOT
TURNING FROM BACK TO SIDE WHILE IN FLAT BAD: A LOT
TOILETING: A LITTLE

## 2023-12-05 ASSESSMENT — PAIN DESCRIPTION - LOCATION
LOCATION: HIP
LOCATION: HIP

## 2023-12-05 ASSESSMENT — PAIN - FUNCTIONAL ASSESSMENT
PAIN_FUNCTIONAL_ASSESSMENT: 0-10

## 2023-12-05 ASSESSMENT — ACTIVITIES OF DAILY LIVING (ADL)
HOME_MANAGEMENT_TIME_ENTRY: 41
BATHING_LEVEL_OF_ASSISTANCE: SETUP;MAXIMUM ASSISTANCE

## 2023-12-05 ASSESSMENT — PAIN SCALES - GENERAL
PAINLEVEL_OUTOF10: 4
PAINLEVEL_OUTOF10: 1
PAINLEVEL_OUTOF10: 3
PAINLEVEL_OUTOF10: 0 - NO PAIN
PAINLEVEL_OUTOF10: 3
PAINLEVEL_OUTOF10: 4
PAINLEVEL_OUTOF10: 0 - NO PAIN

## 2023-12-05 ASSESSMENT — PAIN DESCRIPTION - ORIENTATION
ORIENTATION: RIGHT
ORIENTATION: RIGHT

## 2023-12-05 NOTE — PROGRESS NOTES
Polina Wilcox is a 83 y.o. female on day 2 of admission presenting with Fracture of right pelvis, closed, initial encounter (CMS/[facility]).      Subjective   Patient smiling and pleasant - admits good appetite - denies n/v pain or sob.        Objective     Last Recorded Vitals  /50 (BP Location: Left arm, Patient Position: Lying)   Pulse 71   Temp 36.3 °C (97.3 °F) (Temporal)   Resp 18   Wt (!) 38.4 kg (84 lb 10.5 oz)   SpO2 95%   Intake/Output last 3 Shifts:    Intake/Output Summary (Last 24 hours) at 12/5/2023 1146  Last data filed at 12/5/2023 1000  Gross per 24 hour   Intake 2398.33 ml   Output 625 ml   Net 1773.33 ml       Admission Weight  Weight: (!) 38.1 kg (84 lb) (12/03/23 1316)    Daily Weight  12/03/23 : (!) 38.4 kg (84 lb 10.5 oz)    Image Results  CT pelvis wo IV contrast  Narrative: Interpreted By:  Kartik Schilling,   STUDY:  CT PELVIS WO IV CONTRAST; 12/3/2023 3:23 pm      INDICATION:  Signs/Symptoms:right hip and pelvic pain post fall.      COMPARISON:  None.      ACCESSION NUMBER(S):  [id number]      ORDERING CLINICIAN:  SEB GREEN      TECHNIQUE:  Contiguous axial CT images were obtained through the pelvis at 3 mm  slice thickness without contrast administration. The images were then  reconstructed in the coronal and sagittal planes.      FINDINGS:  BONES:  There is a comminuted fracture identified involving the anterior  aspect of right pelvic wing. No additional fracture is seen.      Mild degenerative changes are seen in the sacroiliac joints  bilaterally. The hips are grossly well-preserved.      SOFT TISSUES:  No definite masses or abnormal fluid collections are identified.      No definite hernias are identified.      PERITONEUM:  Evaluation of the visualized soft tissues of the abdomen is limited  by the lack of intravenous contrast. Within this limitation, no gross  mass or lymphadenopathy is identified.      Impression: 1. Right pelvic fracture, as above.       MACRO:  None.      Signed by: Kartik Schilling 12/3/2023 3:50 PM  Dictation workstation:   [id number]  XR hip right with pelvis when performed 2 or 3 views, XR femur right 2+ views  Narrative: Interpreted By:  Luzmaria Ag,   STUDY:  XR HIP RIGHT WITH PELVIS WHEN PERFORMED 2 OR 3 VIEWS; XR FEMUR RIGHT  2+ VIEWS;  12/3/2023 2:23 pm      INDICATION:  Signs/Symptoms:right hip pain after fall in shower this morning;  Signs/Symptoms:right femur pain after a fall in the shower this  morning.      COMPARISON:  None.      ACCESSION NUMBER(S):  [id number]; [id number]      ORDERING CLINICIAN:  SEB GREEN      FINDINGS:  There is a comminuted displaced fracture of the right iliac bone. The  bilateral hip joints are well preserved. No dislocation is noted.      Calcific densities are noted in the right proximal tibia, which may  be related to bone infarct or enchondroma.      Impression: Comminuted displaced fracture of the right iliac bone. Further  evaluation with pelvic CT is suggested.      No definite bilateral hip fracture or right knee fracture.      Probable enchondroma versus a bone infarct in the right proximal  tibia.          MACRO:  None      Signed by: Luzmaria Ag 12/3/2023 2:27 PM  Dictation workstation:   [id number]      Physical Exam  Constitutional:       Appearance: Normal appearance.   HENT:      Head: Normocephalic.      Mouth/Throat:      Mouth: Mucous membranes are moist.   Eyes:      Extraocular Movements: Extraocular movements intact.   Cardiovascular:      Rate and Rhythm: Normal rate and regular rhythm.      Pulses: Normal pulses.      Heart sounds: Normal heart sounds.   Pulmonary:      Effort: Pulmonary effort is normal.      Breath sounds: Normal breath sounds.   Abdominal:      General: Bowel sounds are normal.      Palpations: Abdomen is soft.   Musculoskeletal:         General: Normal range of motion.      Cervical back: Normal range of motion.   Skin:     General: Skin is warm and  dry.      Capillary Refill: Capillary refill takes less than 2 seconds.   Neurological:      General: No focal deficit present.      Mental Status: She is alert and oriented to person, place, and time.   Psychiatric:         Mood and Affect: Mood normal.         Behavior: Behavior normal.         Relevant Results           Scheduled medications  acetaminophen, 975 mg, oral, q8h  heparin (porcine), 5,000 Units, subcutaneous, q8h  pantoprazole, 40 mg, oral, Daily before breakfast   Or  pantoprazole, 40 mg, intravenous, Daily before breakfast  pravastatin, 40 mg, oral, Nightly  sennosides, 2 tablet, oral, BID  sertraline, 150 mg, oral, Daily      Continuous medications     PRN medications  PRN medications: ondansetron **OR** ondansetron, oxyCODONE, polyethylene glycol  Results for orders placed or performed during the hospital encounter of 12/03/23 (from the past 24 hour(s))   CBC   Result Value Ref Range    WBC 6.7 4.4 - 11.3 x10*3/uL    nRBC 0.0 0.0 - 0.0 /100 WBCs    RBC 3.22 (L) 4.00 - 5.20 x10*6/uL    Hemoglobin 8.7 (L) 12.0 - 16.0 g/dL    Hematocrit 29.7 (L) 36.0 - 46.0 %    MCV 92 80 - 100 fL    MCH 27.0 26.0 - 34.0 pg    MCHC 29.3 (L) 32.0 - 36.0 g/dL    RDW 14.9 (H) 11.5 - 14.5 %    Platelets 202 150 - 450 x10*3/uL   Basic metabolic panel   Result Value Ref Range    Glucose 97 74 - 99 mg/dL    Sodium 143 136 - 145 mmol/L    Potassium 4.3 3.5 - 5.3 mmol/L    Chloride 115 (H) 98 - 107 mmol/L    Bicarbonate 21 21 - 32 mmol/L    Anion Gap 11 10 - 20 mmol/L    Urea Nitrogen 25 (H) 6 - 23 mg/dL    Creatinine 1.06 (H) 0.50 - 1.05 mg/dL    eGFR 53 (L) >60 mL/min/1.73m*2    Calcium 7.9 (L) 8.6 - 10.3 mg/dL   Creatine Kinase   Result Value Ref Range    Creatine Kinase 360 (H) 0 - 215 U/L     Polina L Wilcox is a 83 y.o. female presented to [facility] from home. Patient sustained a fall in her shower. She lives alone. She has a medic alert pendant and was able to geovanni 911.  In ED  XR Right FEMUR There is a comminuted  displaced fracture of the right iliac bone. The bilateral hip joints are well preserved. No dislocation is noted.   Calcific densities are noted in the right proximal tibia, which may be related to bone infarct or enchondroma.   Order PT OT Consult and Treat Consult SW for discharge planning to SNF  Labs showed Lactate 2.3  Order NS @ 100 ml/hr .  12/04: >360, Cr 1.25>1.14>1.06   ortho consult - tx medically WBAT follow up  6- 8 wks   Plan discharge to [facility] - may go on or after 12/6 - no precert required     Assessment/Plan    #Right pelvic fracture s/p mechanical fall  #Rhabdomyolysis  #DUSTY (improving)   #Lactic acidosis (resolved)   - Patient slipped and fell in her shower; denies hitting her head or loss of consciousness. Not on AC   - XR: Comminuted displaced fracture of the right iliac bone. Further   evaluation with pelvic CT is suggested. No definite bilateral hip fracture or right knee fracture. 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   - CT pelvis w/o contrast: There is a comminuted fracture identified involving the anterior aspect of right pelvic wing. No additional fracture is seen.   - >360 - good po intake will discontinue MIV  - Lactate 2.8 > 2.3 > 2.0  - Cr 1.25 > 1.14>1.06  - Discontinue NS at 100 ml/hr - received a total of 1500 ml NS in the ED, MIV @100ml/hr the following day  - Avoid nephrotoxic meds/renally dose as needed  - Trend CK, BMP daily   - monitor UOP   - Tylenol 975 mg PO q8h scheduled- pain currently controlled  - Oxycodone 5 mg PO q6h PRN for severe pain 7-10  - Senokot for prevention of constipation while on opiates   - PT/OT evals- recommending moderate intensity therapy on discharge   - TCC for discharge planning/SNF placement- patient from home alone   - Orthopedics consulted, appreciate recs      #HLD  - Continue pravastatin      #Anxiety    - Continue sertraline     DVT PPx: Heparin   GI PPx: Protonix   Diet: Regular    Code Status: Full code      Disposition: Patient requires inpatient management at this time.      Total accumulated time spent face to face and not face to face preparing to see the patient, obtaining and reviewing separately obtained history; performing a medically appropriate examination and/or evaluation; counseling and educating the patient, family; ordering medications, tests, or procedures; referring and communicating with other health care professionals; documenting clinical information in the patient's medical record; independently interpreting results and communicating the results to the patient, family; and care coordination was 45 minutes.              Principal Problem:    Fracture of right pelvis, closed, initial encounter (CMS/[facility])                  Denisse Rey, APRN-CNP

## 2023-12-05 NOTE — PROGRESS NOTES
Occupational Therapy    Occupational Therapy Treatment    Name: Polina Wilcox  MRN: [id number]  : 1940  Date: 23  Time Calculation  Start Time: 906  Stop Time: 947  Time Calculation (min): 41 min    Assessment:  OT Assessment: Good paticipation with the pt. receptive to use of a reacher for increased ease with LE dressing.  Pt. demonstrated clothing initiation over her distal LE's(Donning a pull up brief & a pair of pants) with use of the reacher with Close S & cues to begin with her R LE first for increased ease.  ModA to maxA & cues for sit to stand transfers from the bedside chair with use of a cuco walker.  CGA & cues for static standing balance with a cuco walker while caregiver assisted the pt. with self care performance.  Evaluation/Treatment Tolerance: Patient tolerated treatment well  End of Session Communication: Bedside nurse  End of Session Patient Position: Alarm on, Up in chair  Plan:  Treatment Interventions: ADL retraining, Functional transfer training, Patient/family training, Equipment evaluation/education, Neuromuscular reeducation, Compensatory technique education  OT Frequency: 4 times per week  OT Discharge Recommendations: Moderate intensity level of continued care  OT Recommended Transfer Status: Moderate assist, Assist of 1  OT - OK to Discharge: Yes Based on completed evaluation and care plan recommendations, no barriers to discharge to next site of care      Subjective   Previous Visit Info:  OT Last Visit  OT Received On: 23  General:  General  Reason for Referral: Impaired self care skills & functional transfers due to R pelvic fx  Past Medical History Relevant to Rehab: CP, anxiety, depression, HLD, R hand contracture  Family/Caregiver Present: No  Prior to Session Communication: Bedside nurse  Patient Position Received: Up in chair, Alarm on  Preferred Learning Style: visual, verbal  Precautions:  LE Weight Bearing Status: WBAT on R LE  Medical Precautions:  Fall precautions  Vitals:  Vital Signs  Heart Rate: 76  Heart Rate Source: Monitor  SpO2: 92 %  Patient Position: Sitting  Pain Assessment:  Pain Assessment  Pain Assessment: 0-10  Pain Score: 4  Pain Location: Hip  Pain Orientation: Right  Pain Interventions: Repositioned     Objective   Activities of Daily Living: Grooming  Grooming Level of Assistance: Close supervision, Setup  Grooming Where Assessed: Chair    UE Bathing  UE Bathing Level of Assistance: Setup, Minimum assistance  UE Bathing Comments: Assistance provided to bathe her L UE & L underarm  LE Bathing  LE Bathing Level of Assistance: Setup, Maximum assistance  LE Bathing Where Assessed: bedside chair  LE Bathing Comments:  The pt. was able to bathe her proximal B LE's.  Assistance provided to bathe her distal LE's & her perienal hygiene area due to pt. requiring L UE support to maintain standing balance.    UE Dressing  UE Dressing Level of Assistance:  (Close SBA to don a pre tied hospital gown to to simulate a pull over shirt.)  UE Dressing Where Assessed: Chair    LE Dressing  LE Dressing: Yes  Pants Level of Assistance: Pt. was able to initiate a pair of pants with Close S & cues with use of a reacher.  MaxA & cues for the stand phase of dressing as the pt. was able to assist minimally with pants adjustment over her hips with modA & cues for balance.  Sock Level of Assistance: Pt. attempted to don her socks, however was unable to do so due to decreased functional erach.  Discussed with the pt. use of a notched sock aid for use with one hand.  Shoe Level of Assistance:  Assistance provided to don B slip on shoes  Adult Briefs Level of Assistance:  Pt. began the pull up brief over her distal LE's with a reacher with Close S & cues.  MaxA & cues to assist with pull up brief management over her hips with the pt. able to assist minimally with this however while requiring modA & cues for balance.  LE Dressing Where Assessed: Chair     Bed  Mobility/Transfers: Transfer 1  Technique 1: Sit to stand  Transfer Device 1: Gait belt, Les-walker  Transfer Level of Assistance 1: modA/maxA & cues    Outcome Measures:  [facility] Daily Activity  Putting on and taking off regular lower body clothing: A lot  Bathing (including washing, rinsing, drying): A lot  Putting on and taking off regular upper body clothing: A little  Toileting, which includes using toilet, bedpan or urinal: A lot  Taking care of personal grooming such as brushing teeth: A little  Eating Meals: A little  Daily Activity - Total Score: 15      Education Documentation  Precautions, taught by Janneth Gotti OT at 12/5/2023 12:17 PM.  Learner: Patient  Readiness: Acceptance  Method: Explanation, Demonstration  Response: Verbalizes Understanding    ADL Training, taught by Janneth Gotti OT at 12/5/2023 12:17 PM.  Learner: Patient  Readiness: Acceptance  Method: Explanation, Demonstration  Response: Verbalizes Understanding      Goals:  Encounter Problems       Encounter Problems (Active)       ADLs       Patient will perform UB and LB bathing with minimal assist  level of assistance and shower chair, extended tub bench, and long-handled sponge. (Progressing)       Start:  12/04/23    Expected End:  12/18/23            Patient with complete lower body dressing with contact guard assist level of assistance donning and doffing all LE clothes  with PRN adaptive equipment while edge of bed  (Progressing)       Start:  12/04/23    Expected End:  12/18/23            Patient will complete toileting including hygiene clothing management/hygiene with CGA and raised toilet seat, grab bars, and bedside commode. (Progressing)       Start:  12/04/23    Expected End:  12/18/23               BALANCE       Patient will tolerate standing for 4 minutes to stand by assist level of assistance with least restrictive device in order to improve functional activity tolerance for ADL tasks. (Progressing)       Start:   12/04/23    Expected End:  12/18/23                 TRANSFERS       Patient will complete sit to stand transfer with stand by assist level of assistance and least restrictive device in order to improve safety and prepare for out of bed mobility. (Progressing)       Start:  12/04/23    Expected End:  12/18/23               Transfers       STG - Patient will perform bed mobility IND (Progressing)       Start:  12/04/23    Expected End:  12/18/23            STG - Patient will transfer sit to and from stand Angus with cuco walker  (Progressing)       Start:  12/04/23    Expected End:  12/18/23

## 2023-12-05 NOTE — PROGRESS NOTES
Physical Therapy    Physical Therapy Treatment    Patient Name: Polina Wilcox  MRN: [id number]  Today's Date: 12/5/2023  Time Calculation  Start Time: 0725  Stop Time: 0748  Time Calculation (min): 23 min     Assessment/Plan   End of Session Communication: Bedside nurse  End of Session Patient Position: Up in chair, Alarm on  PT Discharge Recommendations: Moderate intensity level of continued care  General Visit Information:   PT  Visit  PT Received On: 12/05/23   Precautions:  Precautions  LE Weight Bearing Status: Weight Bearing as Tolerated  Medical Precautions: Fall precautions  Objective   Pain:  Pain Assessment  Pain Assessment: 0-10  Pain Score: 0 - No pain  Cognition:     Treatments:  Therapeutic Exercise  Therapeutic Exercise Performed: Yes  Therapeutic Exercise Activity 1: see note Patient performed the following to facilitate increased strength to promote independence with transfers and ambulation to allow for safe return home:  Laqs, hip flexion seated, hip abduction, hip adduction, ankle pumps, quad sets, each x 20.  Patient only able to tolerate a partial LAQ on RLE due to discomfort, vcs required to use full available ROM with hip flexion.    Bed Mobility  Bed Mobility: Yes  Bed Mobility 1  Bed Mobility 1: Supine to sitting  Level of Assistance 1: Maximum assistance (Patient able to initiate supine to sit transfer, required max assist to complete and come to the edge of the bed.)    Ambulation/Gait Training  Ambulation/Gait Training Performed: Yes  Ambulation/Gait Training 1  Surface 1: Level tile  Device 1: Les Walker  Gait Support Devices: Gait belt  Assistance 1: Maximum assistance (Max assist of 1 required to prevent LOB and for proper placement of les-walker.)  Quality of Gait 1: Narrow base of support, Decreased step length, Antalgic. Patient markedly unsteady.  Comments/Distance (ft) 1: 8  Transfers  Transfer: Yes  Transfer 1  Technique 1: Sit to stand, Stand to sit  Transfer Device 1:  Les-walker, Gait belt  Transfer Level of Assistance 1: Maximum assistance (Max assist required for push off and for eccentric control with stand to sit.)    Outcome Measures:  [facility] Basic Mobility  Turning from your back to your side while in a flat bed without using bedrails: A little  Moving from lying on your back to sitting on the side of a flat bed without using bedrails: A lot  Moving to and from bed to chair (including a wheelchair): A lot  Standing up from a chair using your arms (e.g. wheelchair or bedside chair): A lot  To walk in hospital room: A lot  Climbing 3-5 steps with railing: A lot  Basic Mobility - Total Score: 13    Education Documentation  Precautions, taught by Vandana Castaneda PTA at 12/5/2023  8:26 AM.  Learner: Patient  Readiness: Acceptance  Method: Explanation  Response: Verbalizes Understanding  Comment: Educated patient on proper use of les walker.    Home Exercise Program, taught by Vandana Castaneda PTA at 12/5/2023  8:26 AM.  Learner: Patient  Readiness: Acceptance  Method: Explanation  Response: Verbalizes Understanding  Comment: Educated patient on proper use of les walker.    Mobility Training, taught by Vandana Castaneda PTA at 12/5/2023  8:26 AM.  Learner: Patient  Readiness: Acceptance  Method: Explanation  Response: Verbalizes Understanding  Comment: Educated patient on proper use of les walker.    Education Comments  No comments found.      Encounter Problems       Encounter Problems (Active)       Balance       STG - Maintains dynamic standing balance with 1 UE upper extremity support with good balance  (Progressing)       Start:  12/04/23    Expected End:  12/18/23               Mobility       LTG - Patient will ambulate household distance Angus with les walker  (Progressing)       Start:  12/04/23    Expected End:  12/18/23               Pain - Adult          Transfers       STG - Patient will perform bed mobility IND (Progressing)       Start:  12/04/23     Expected End:  12/18/23            STG - Patient will transfer sit to and from stand Angus with cuco walker  (Progressing)       Start:  12/04/23    Expected End:  12/18/23

## 2023-12-05 NOTE — PROGRESS NOTES
Discussed plan of care in interdisciplinary rounds,  anticipating discharge for tomorrow, CK continues to improve.    Patient is accepted at [facility] for skilled rehab, facility is willing and able to accept on discharge.   Updates attached to the referral and sent.          Plan:  [facility] on or after 12/6.  No precert needed.  MD will need to sign/certify the [facility] for skilled rehab prior to discharge

## 2023-12-05 NOTE — CARE PLAN
Problem: Skin  Goal: Participates in plan/prevention/treatment measures  Outcome: Progressing  Flowsheets (Taken 12/5/2023 2209)  Participates in plan/prevention/treatment measures:   Discuss with provider PT/OT consult   Increase activity/out of bed for meals  Goal: Prevent/manage excess moisture  Outcome: Progressing  Goal: Prevent/minimize sheer/friction injuries  Outcome: Progressing  Goal: Promote/optimize nutrition  Outcome: Progressing  Goal: Promote skin healing  Outcome: Progressing   The patient's goals for the shift include      The clinical goals for the shift include OOB for at least 2 meals    Worked with PT/ OT.  OOB to chair.  Antic discharge to [facility] tomorrow.

## 2023-12-05 NOTE — CARE PLAN
The patient's goals for the shift include      The clinical goals for the shift include free of injuries tonight      Pt slept well. Uneventful night

## 2023-12-06 ENCOUNTER — APPOINTMENT (OUTPATIENT)
Dept: CARDIOLOGY | Facility: HOSPITAL | Age: 83
DRG: 536 | End: 2023-12-06
Payer: MEDICARE

## 2023-12-06 PROBLEM — E87.20 LACTIC ACIDOSIS: Status: ACTIVE | Noted: 2023-12-06

## 2023-12-06 PROBLEM — F41.9 ANXIETY: Status: ACTIVE | Noted: 2023-12-06

## 2023-12-06 PROBLEM — E78.2 MIXED HYPERLIPIDEMIA: Status: ACTIVE | Noted: 2023-12-06

## 2023-12-06 PROBLEM — M62.82 RHABDOMYOLYSIS: Status: ACTIVE | Noted: 2023-12-06

## 2023-12-06 PROBLEM — N17.9 AKI (ACUTE KIDNEY INJURY) (CMS-HCC): Status: ACTIVE | Noted: 2023-12-06

## 2023-12-06 LAB
ANION GAP SERPL CALC-SCNC: 10 MMOL/L (ref 10–20)
ATRIAL RATE: 81 BPM
BASOPHILS # BLD AUTO: 0.02 X10*3/UL (ref 0–0.1)
BASOPHILS NFR BLD AUTO: 0.4 %
BUN SERPL-MCNC: 23 MG/DL (ref 6–23)
CALCIUM SERPL-MCNC: 7.6 MG/DL (ref 8.6–10.3)
CHLORIDE SERPL-SCNC: 117 MMOL/L (ref 98–107)
CK SERPL-CCNC: 276 U/L (ref 0–215)
CO2 SERPL-SCNC: 22 MMOL/L (ref 21–32)
CREAT SERPL-MCNC: 0.96 MG/DL (ref 0.5–1.05)
EOSINOPHIL # BLD AUTO: 0.3 X10*3/UL (ref 0–0.4)
EOSINOPHIL NFR BLD AUTO: 5.8 %
ERYTHROCYTE [DISTWIDTH] IN BLOOD BY AUTOMATED COUNT: 15.3 % (ref 11.5–14.5)
GFR SERPL CREATININE-BSD FRML MDRD: 59 ML/MIN/1.73M*2
GLUCOSE SERPL-MCNC: 106 MG/DL (ref 74–99)
HCT VFR BLD AUTO: 25 % (ref 36–46)
HGB BLD-MCNC: 7.8 G/DL (ref 12–16)
IMM GRANULOCYTES # BLD AUTO: 0.02 X10*3/UL (ref 0–0.5)
IMM GRANULOCYTES NFR BLD AUTO: 0.4 % (ref 0–0.9)
LYMPHOCYTES # BLD AUTO: 1.22 X10*3/UL (ref 0.8–3)
LYMPHOCYTES NFR BLD AUTO: 23.7 %
MCH RBC QN AUTO: 27.6 PG (ref 26–34)
MCHC RBC AUTO-ENTMCNC: 31.2 G/DL (ref 32–36)
MCV RBC AUTO: 88 FL (ref 80–100)
MONOCYTES # BLD AUTO: 0.37 X10*3/UL (ref 0.05–0.8)
MONOCYTES NFR BLD AUTO: 7.2 %
NEUTROPHILS # BLD AUTO: 3.22 X10*3/UL (ref 1.6–5.5)
NEUTROPHILS NFR BLD AUTO: 62.5 %
NRBC BLD-RTO: ABNORMAL /100{WBCS}
P AXIS: 59 DEGREES
P OFFSET: 213 MS
P ONSET: 165 MS
PLATELET # BLD AUTO: 214 X10*3/UL (ref 150–450)
POTASSIUM SERPL-SCNC: 3.7 MMOL/L (ref 3.5–5.3)
PR INTERVAL: 122 MS
Q ONSET: 226 MS
QRS COUNT: 13 BEATS
QRS DURATION: 72 MS
QT INTERVAL: 408 MS
QTC CALCULATION(BAZETT): 473 MS
QTC FREDERICIA: 450 MS
R AXIS: 26 DEGREES
RBC # BLD AUTO: 2.83 X10*6/UL (ref 4–5.2)
SODIUM SERPL-SCNC: 145 MMOL/L (ref 136–145)
T AXIS: 57 DEGREES
T OFFSET: 430 MS
VENTRICULAR RATE: 81 BPM
WBC # BLD AUTO: 5.2 X10*3/UL (ref 4.4–11.3)

## 2023-12-06 PROCEDURE — 82550 ASSAY OF CK (CPK): CPT | Mod: IPSPLIT | Performed by: NURSE PRACTITIONER

## 2023-12-06 PROCEDURE — 2500000004 HC RX 250 GENERAL PHARMACY W/ HCPCS (ALT 636 FOR OP/ED): Mod: IPSPLIT | Performed by: NURSE PRACTITIONER

## 2023-12-06 PROCEDURE — 85025 COMPLETE CBC W/AUTO DIFF WBC: CPT | Mod: IPSPLIT | Performed by: NURSE PRACTITIONER

## 2023-12-06 PROCEDURE — 96372 THER/PROPH/DIAG INJ SC/IM: CPT | Mod: IPSPLIT | Performed by: NURSE PRACTITIONER

## 2023-12-06 PROCEDURE — 2500000001 HC RX 250 WO HCPCS SELF ADMINISTERED DRUGS (ALT 637 FOR MEDICARE OP): Mod: IPSPLIT | Performed by: NURSE PRACTITIONER

## 2023-12-06 PROCEDURE — 97535 SELF CARE MNGMENT TRAINING: CPT | Mod: GO,IPSPLIT

## 2023-12-06 PROCEDURE — 80048 BASIC METABOLIC PNL TOTAL CA: CPT | Mod: IPSPLIT | Performed by: NURSE PRACTITIONER

## 2023-12-06 PROCEDURE — 93005 ELECTROCARDIOGRAM TRACING: CPT | Mod: IPSPLIT

## 2023-12-06 PROCEDURE — 36415 COLL VENOUS BLD VENIPUNCTURE: CPT | Mod: IPSPLIT | Performed by: NURSE PRACTITIONER

## 2023-12-06 PROCEDURE — 1100000001 HC PRIVATE ROOM DAILY: Mod: IPSPLIT

## 2023-12-06 PROCEDURE — 97110 THERAPEUTIC EXERCISES: CPT | Mod: GP,IPSPLIT | Performed by: PHYSICAL THERAPIST

## 2023-12-06 RX ADMIN — ACETAMINOPHEN 975 MG: 325 TABLET ORAL at 21:22

## 2023-12-06 RX ADMIN — HEPARIN SODIUM 5000 UNITS: 5000 INJECTION INTRAVENOUS; SUBCUTANEOUS at 03:16

## 2023-12-06 RX ADMIN — ACETAMINOPHEN 975 MG: 325 TABLET ORAL at 13:27

## 2023-12-06 RX ADMIN — SERTRALINE HYDROCHLORIDE 150 MG: 50 TABLET ORAL at 09:30

## 2023-12-06 RX ADMIN — HEPARIN SODIUM 5000 UNITS: 5000 INJECTION INTRAVENOUS; SUBCUTANEOUS at 12:23

## 2023-12-06 RX ADMIN — ACETAMINOPHEN 975 MG: 325 TABLET ORAL at 06:07

## 2023-12-06 RX ADMIN — PRAVASTATIN SODIUM 40 MG: 20 TABLET ORAL at 21:22

## 2023-12-06 RX ADMIN — PANTOPRAZOLE SODIUM 40 MG: 40 TABLET, DELAYED RELEASE ORAL at 06:07

## 2023-12-06 ASSESSMENT — COGNITIVE AND FUNCTIONAL STATUS - GENERAL
TOILETING: A LITTLE
PERSONAL GROOMING: A LITTLE
DRESSING REGULAR UPPER BODY CLOTHING: A LITTLE
WALKING IN HOSPITAL ROOM: A LITTLE
CLIMB 3 TO 5 STEPS WITH RAILING: A LOT
DRESSING REGULAR UPPER BODY CLOTHING: A LITTLE
MOBILITY SCORE: 16
PERSONAL GROOMING: A LITTLE
MOBILITY SCORE: 16
DRESSING REGULAR UPPER BODY CLOTHING: A LITTLE
EATING MEALS: A LITTLE
HELP NEEDED FOR BATHING: A LITTLE
MOVING FROM LYING ON BACK TO SITTING ON SIDE OF FLAT BED WITH BEDRAILS: A LITTLE
TURNING FROM BACK TO SIDE WHILE IN FLAT BAD: A LITTLE
TURNING FROM BACK TO SIDE WHILE IN FLAT BAD: A LITTLE
TOILETING: A LOT
MOVING TO AND FROM BED TO CHAIR: A LITTLE
STANDING UP FROM CHAIR USING ARMS: A LITTLE
DAILY ACTIVITIY SCORE: 16
STANDING UP FROM CHAIR USING ARMS: A LOT
WALKING IN HOSPITAL ROOM: A LOT
DAILY ACTIVITIY SCORE: 17
MOVING TO AND FROM BED TO CHAIR: A LITTLE
CLIMB 3 TO 5 STEPS WITH RAILING: A LOT
DRESSING REGULAR LOWER BODY CLOTHING: A LOT
MOVING TO AND FROM BED TO CHAIR: A LITTLE
TOILETING: A LITTLE
HELP NEEDED FOR BATHING: A LITTLE
PERSONAL GROOMING: A LITTLE
MOBILITY SCORE: 18
STANDING UP FROM CHAIR USING ARMS: A LITTLE
WALKING IN HOSPITAL ROOM: A LOT
HELP NEEDED FOR BATHING: A LOT
CLIMB 3 TO 5 STEPS WITH RAILING: TOTAL
DRESSING REGULAR LOWER BODY CLOTHING: A LOT

## 2023-12-06 ASSESSMENT — PAIN SCALES - GENERAL
PAINLEVEL_OUTOF10: 0 - NO PAIN
PAINLEVEL_OUTOF10: 3
PAINLEVEL_OUTOF10: 0 - NO PAIN
PAINLEVEL_OUTOF10: 3
PAINLEVEL_OUTOF10: 1

## 2023-12-06 ASSESSMENT — PAIN - FUNCTIONAL ASSESSMENT
PAIN_FUNCTIONAL_ASSESSMENT: 0-10

## 2023-12-06 ASSESSMENT — ACTIVITIES OF DAILY LIVING (ADL)
HOME_MANAGEMENT_TIME_ENTRY: 47
BATHING_WHERE_ASSESSED: EDGE OF BED
BATHING_LEVEL_OF_ASSISTANCE: SETUP;MAXIMUM ASSISTANCE

## 2023-12-06 NOTE — NURSING NOTE
1110- Assumed care of Pt. Nurse to nurse report completed. Pt sitting in chair awake and alert watching tv. Chair alarm on. Call bell and bedside table within reach. Pt denies any needs at this time.

## 2023-12-06 NOTE — CARE PLAN
The patient's goals for the shift include  Headache to get better    The clinical goals for the shift include Pt will be from [place] during shift    Over the shift, the patient remained free from injury. Received doses of subcutaneous heparin per mar. Pt took pills whole with chocolate pudding. Pt is on RA. V/S stable. Pt educated on POC and questions answered.

## 2023-12-06 NOTE — PROGRESS NOTES
Physical Therapy    Physical Therapy Treatment    Patient Name: Polina Wilcox  MRN: [id number]  Today's Date: 12/6/2023  Time Calculation  Start Time: 0930  Stop Time: 0945  Time Calculation (min): 15 min       Assessment/Plan   PT Assessment  PT Assessment Results:  (pt. making progress towards goals. Cont. to c/o pain with activity and amb. (significant antalgic gait) Decreased endurance noted.)  PT Plan  Inpatient/Swing Bed or Outpatient: Inpatient  PT Plan  Treatment/Interventions: Bed mobility, Transfer training, Gait training, Balance training, Neuromuscular re-education, Strengthening, Endurance training, Therapeutic exercise, Therapeutic activity, Home exercise program  PT Plan: Skilled PT  PT Frequency: 4 times per week  PT Discharge Recommendations: Moderate intensity level of continued care  PT Recommended Transfer Status: Assist x1   PT - OK to Discharge: Yes      General Visit Information:   PT  Visit  PT Received On: 12/06/23  General  Prior to Session Communication: Bedside nurse  Patient Position Received: Up in chair, Alarm on    Subjective   Precautions:  Precautions  Medical Precautions: Fall precautions  Vital Signs:  Vital Signs  Heart Rate: 92  SpO2: 93 %    Objective   Pain:  Pain Assessment  Pain Assessment: 0-10  Pain Score: 3 (increased to 6/10 after session)  Pain Location: Hip  Pain Orientation: Right  Cognition:  Cognition  Overall Cognitive Status: Within Functional Limits    Activity Tolerance:  Activity Tolerance  Endurance: Decreased tolerance for upright activites (increase in hip pain noted with activity)  Treatments:  Therapeutic Exercise  Therapeutic Exercise Performed: Yes  Therapeutic Exercise Activity 1: B ankle pumps 10x2, LAQ 10x2 (only 1/2 ROM on RLE). LLE hip flexion 10x2    Ambulation/Gait Training  Ambulation/Gait Training Performed: Yes  Ambulation/Gait Training 1  Device 1: Les Walker  Assistance 1: Minimum assistance (modA amb. backwards)  Quality of Gait 1:  Narrow base of support, Antalgic  Comments/Distance (ft) 1: 3' x 2  Transfers  Transfer: Yes  Transfer 1  Technique 1: Sit to stand  Transfer Device 1: Les-walker  Transfer Level of Assistance 1: Minimum assistance  Trials/Comments 1: x 12    Outcome Measures:  [facility] Basic Mobility  Turning from your back to your side while in a flat bed without using bedrails: A little  Moving from lying on your back to sitting on the side of a flat bed without using bedrails: A little  Moving to and from bed to chair (including a wheelchair): A little  Standing up from a chair using your arms (e.g. wheelchair or bedside chair): A little  To walk in hospital room: A lot  Climbing 3-5 steps with railing: A lot  Basic Mobility - Total Score: 16    Education Documentation  Mobility Training, taught by Chio Kurtz PT at 12/6/2023  9:54 AM.  Learner: Patient  Readiness: Acceptance  Method: Explanation  Response: Demonstrated Understanding  Comment: VC's for sequencing with transfers    Education Comments  No comments found.        OP EDUCATION:       Encounter Problems       Encounter Problems (Active)       Balance       STG - Maintains dynamic standing balance with 1 UE upper extremity support with good balance  (Progressing)       Start:  12/04/23    Expected End:  12/18/23               Mobility       LTG - Patient will ambulate household distance Angus with les walker  (Progressing)       Start:  12/04/23    Expected End:  12/18/23               Pain - Adult          Transfers       STG - Patient will perform bed mobility IND (Progressing)       Start:  12/04/23    Expected End:  12/18/23            STG - Patient will transfer sit to and from stand Angus with les walker  (Progressing)       Start:  12/04/23    Expected End:  12/18/23

## 2023-12-06 NOTE — PROGRESS NOTES
Occupational Therapy    Occupational Therapy Treatment    Name: Polina Wilcox  MRN: [id number]  : 1940  Date: 23  Time Calculation  Start Time: 801  Stop Time: 48  Time Calculation (min): 47 min    Assessment:  OT Assessment: Good participation in self care with the pt. demonstrating sit to stand transfers with a cuco walker with Wesley to modA & cues.  ModA & cues for transfers to a BSC with use of a cuco walker. Good carry over with use of a reacher for increased ease with LE dressing. ModA & cues for standing balance during self care performance due to the pt. demonstrating a posterior loss of balance while standing.  Prognosis: Good  Barriers to Discharge: Decreased caregiver support  Evaluation/Treatment Tolerance: Patient tolerated treatment well  Medical Staff Made Aware: Yes  End of Session Communication: Bedside nurse  End of Session Patient Position: Alarm on, Up in chair  Plan:  Treatment Interventions: ADL retraining, Functional transfer training, Patient/family training, Equipment evaluation/education, Neuromuscular reeducation, Compensatory technique education  OT Frequency: 4 times per week  OT Discharge Recommendations: Moderate intensity level of continued care  OT Recommended Transfer Status: Assist of 1  OT - OK to Discharge: Yes Based on completed evaluation and care plan recommendations, no barriers to discharge to next site of care      Subjective      General:  General  Reason for Referral: Impaired self care skills & functional transfers due to R pelvic fx  Past Medical History Relevant to Rehab: CP, anxiety depression, [place]  Family/Caregiver Present: No  Prior to Session Communication: Bedside nurse  Precautions:  Precautions Comment: safety precautions, fall risk, WBAT on L LE  Vitals:  Vital Signs  Heart Rate: 81  Heart Rate Source: Monitor  SpO2: 96 %  Patient Position: Sitting  Pain Assessment:  Pain Assessment  Pain Assessment:  (R hip)  Pain Score:  (3-4/10)  Pain  Location:  (hip)  Pain Orientation: Right     Objective   Activities of Daily Living: Grooming  Grooming Level of Assistance: S set up  Grooming Where Assessed:  Edge of bed    UE Bathing  UE Bathing Level of Assistance: Setup, Minimum assistance  UE Bathing Where Assessed: Edge of bed  UE Bathing Comments: Wesley to assist the pt. with bathing her L UE    LE Bathing  LE Bathing Level of Assistance: Setup, Maximum assistance  LE Bathing Where Assessed: Edge of bed  LE Bathing Comments: Pt. bathed her B LE's with Wesley & cues to bathe her distal LE's. Recommended use of a long handled sponge to bathe her distal LE's.  ModA & cues for standing balance while performing perineal hygiene care.    UE Dressing  UE Dressing Level of Assistance: Setup  UE Dressing Where Assessed: Edge of bed  & from the [facility]  UE Dressing Comments: Pt. donned a button down shirt with S set up    LE Dressing  LE Dressing: Yes  LE Dressing Adaptive Equipment: Reacher  Pants Level of Assistance:  S set up & cues to begin pants over her distal LE's with a reacher  ModA & cues for the stand phase of dressing.  Shoe Level of Assistance:  Assistance needed to don B slip on shoes.  Adult Briefs Level of Assistance:  S set up & cues to begin the pull up brief over her distal LE's with a reacher. ModA & cues for the stand phase of dressing.  LE Dressing Where Assessed: Chair  LE Dressing Comments: Pt. initiated a pull up brief & a pair of pants over her B LE's with a reacher with S set up & cues. ModA & cues for standing balance while standing to manage her clothing over her hips due to pt. experiencing a posterior loss of balance.    Toileting  Toileting Level of Assistance: Moderate assistance  Where Assessed: [facility]  Toileting Comments: ModA & cues for standing balance while performing perineal hygiene care & clothing management over her hips.     Bed Mobility/Transfers: Transfer 1  Technique 1: Stand pivot, Sit to stand, Stand to sit  Transfer Device 1:  Gait belt, Les-walker  Transfer Level of Assistance 1: Sit to stand with Wesley to modA & cues with a les walker.  ModA & cues for stand pivot transfers with a les walker.  Trials/Comments 1: Mod cues for safety    Toilet Transfers  Toilet Transfer From: [facility]  Toilet Transfer Technique: Stand pivot  Toilet Transfers: Moderate assistance with use of a les walker  Toilet Transfers Comments: mod cues for safety    Outcome Measures:  [facility] Daily Activity  Putting on and taking off regular lower body clothing: A lot  Bathing (including washing, rinsing, drying): A lot  Putting on and taking off regular upper body clothing: A little  Toileting, which includes using toilet, bedpan or urinal: A lot  Taking care of personal grooming such as brushing teeth: A little  Eating Meals: None  Daily Activity - Total Score: 16      Education Documentation  Precautions, taught by Janneth Gotti OT at 12/6/2023  1:12 PM.  Learner: Patient  Readiness: Acceptance  Method: Explanation, Demonstration  Response: Verbalizes Understanding, Demonstrated Understanding    ADL Training, taught by Janneth Gotti OT at 12/6/2023  1:12 PM.  Learner: Patient  Readiness: Acceptance  Method: Explanation, Demonstration  Response: Verbalizes Understanding, Demonstrated Understanding    Goals:  Encounter Problems       Encounter Problems (Active)       ADLs       Patient will perform UB and LB bathing with minimal assist  level of assistance and shower chair, extended tub bench, and long-handled sponge. (Progressing)       Start:  12/04/23    Expected End:  12/18/23            Patient with complete lower body dressing with contact guard assist level of assistance donning and doffing all LE clothes  with PRN adaptive equipment while edge of bed  (Progressing)       Start:  12/04/23    Expected End:  12/18/23            Patient will complete toileting including hygiene clothing management/hygiene with CGA and raised toilet seat, grab bars, and bedside  kamar. (Progressing)       Start:  12/04/23    Expected End:  12/18/23               BALANCE       Patient will tolerate standing for 4 minutes to stand by assist level of assistance with least restrictive device in order to improve functional activity tolerance for ADL tasks. (Progressing)       Start:  12/04/23    Expected End:  12/18/23                   TRANSFERS       Patient will complete sit to stand transfer with stand by assist level of assistance and least restrictive device in order to improve safety and prepare for out of bed mobility. (Progressing)       Start:  12/04/23    Expected End:  12/18/23               Transfers       STG - Patient will perform bed mobility IND (Progressing)       Start:  12/04/23    Expected End:  12/18/23

## 2023-12-06 NOTE — CARE PLAN
The patient's goals for the shift include  Pt discharging to [facility].

## 2023-12-06 NOTE — PROGRESS NOTES
Patient is medically ready for discharge. Patient follow up information is on discharge instructions. Patient will be going to [facility] for skilled rehab. Patient is in agreement with discharge plan. DSC completed 7000 and sent final orders. Transportation in the process of being scheduled.

## 2023-12-06 NOTE — DISCHARGE SUMMARY
Discharge Diagnosis  Fracture of right pelvis, closed, initial encounter (CMS/[facility])    Discharge Meds     Your medication list        CONTINUE taking these medications        Instructions Last Dose Given Next Dose Due   pravastatin 40 mg tablet  Commonly known as: Pravachol           sertraline 100 mg tablet  Commonly known as: Zoloft                    Test Results Pending At Discharge  Pending Labs       No current pending labs.            Hospital Course   Polina Wilcox is a 83 y.o. female presented to [facility] from home. Patient sustained a fall in her shower. She lives alone. She has a medic alert pendant and was able to geovanni 911.  In ED  XR Right FEMUR There is a comminuted displaced fracture of the right iliac bone. The bilateral hip joints are well preserved. No dislocation is noted.   Calcific densities are noted in the right proximal tibia, which may be related to bone infarct or enchondroma.   Order PT OT Consult and Treat Consult SW for discharge planning to SNF  Labs showed Lactate 2.3  Order NS @ 100 ml/hr . On exam patient resting in bed. Alert [employer]. Reports pain is well controlled. No  acute medical needs.      Past Medical History  Medical History        Past Medical History:   Diagnosis Date    Anxiety and depression      Depression      Hyperlipidemia              Surgical History  She has no past surgical history on file.     Social History  Social History               Socioeconomic History    Marital status: Single       Spouse name: Not on file    Number of children: Not on file    Years of education: Not on file    Highest education level: Not on file   Occupational History    Not on file   Tobacco Use    Smoking status: Never    Smokeless tobacco: Never   Substance and Sexual Activity    Alcohol use: Not on file    Drug use: Never    Sexual activity: Not on file   Other Topics Concern    Not on file   Social History Narrative    Not on file      Social Determinants of  Health           Financial Resource Strain: Low Risk  (12/3/2023)     Overall Financial Resource Strain (CARDIA)      Difficulty of Paying Living Expenses: Not very hard   Food Insecurity: Not on file   Transportation Needs: No Transportation Needs (12/3/2023)     PRAPARE - Transportation      Lack of Transportation (Medical): No      Lack of Transportation (Non-Medical): No   Physical Activity: Not on file   Stress: Not on file   Social Connections: Not on file   Intimate Partner Violence: Not on file   Housing Stability: Low Risk  (12/3/2023)     Housing Stability Vital Sign      Unable to Pay for Housing in the Last Year: No      Number of Places Lived in the Last Year: 1      Unstable Housing in the Last Year: No            Family History  Family History   No family history on file.        Allergies  No Known Allergies      Vital Signs  Temp:  [36.2 °C (97.2 °F)-37.1 °C (98.8 °F)] 37.1 °C (98.8 °F)  Heart Rate:  [67-83] 83  Resp:  [16-18] 18  BP: (106-136)/(61) 136/61    CT pelvis wo IV contrast  Result Date: 12/3/2023  1. Right pelvic fracture, as above.        XR hip right with pelvis when performed 2 or 3 views  Result Date: 12/3/2023  Comminuted displaced fracture of the right iliac bone. Further evaluation with pelvic CT is suggested.   No definite bilateral hip fracture or right knee fracture.   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          XR femur right 2+ views  Result Date: 12/3/2023  Comminuted displaced fracture of the right iliac bone. Further evaluation with pelvic CT is suggested.   No definite bilateral hip fracture or right knee fracture.   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          Assessment/Plan   Principal Problem:    Fracture of right pelvis, closed, initial encounter (CMS/[facility])     #Right pelvic fracture s/p mechanical fall  #Rhabdomyolysis  #DUSTY (improving)   #Lactic acidosis (resolved)   - Patient slipped and fell in  her shower; denies hitting her head or loss of consciousness. Not on AC   - XR: Comminuted displaced fracture of the right iliac bone. Further evaluation with pelvic CT is suggested. No definite bilateral hip fracture or right knee fracture. 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   - CT pelvis w/o contrast: There is a comminuted fracture identified involving the anterior aspect of right pelvic wing. No additional fracture is seen.   -   - Lactate 2.8 > 2.3 > 2.0  - Cr 1.25 > 1.14 > 0.96  - Continue NS at 100 ml/hr until CK WNL; received a total of 1500 ml NS in the ED   - Avoid nephrotoxic meds/renally dose as needed  - Trend CK, BMP daily   - monitor UOP   - Tylenol 975 mg PO q8h scheduled- pain currently controlled  - Oxycodone 5 mg PO q6h PRN for severe pain 7-10  - Senokot for prevention of constipation while on opiates   - PT/OT evals- recommending moderate intensity therapy on discharge   - TCC for discharge planning/SNF placement- patient from home alone   - Orthopedics consulted, appreciate recs   ---DC to SNF  ---PT/OT recommendations   ---follow up as needed      #HLD  #Anxiety   - Continue pravastatin   - Continue sertraline  ---Continue all chronic meds  ---Follow up as needed with PCP    Stable for discharge.  Total cumulative time spent in preparation of this discharge including documentation review, coordination of care with the medical team including PT/SW/care coordinators and treating consultants, discussion with patient and pertinent family members and finalization of prescriptions, follow-up appointments, and this discharge summary was approximately 30 minutes.    Pertinent Physical Exam At Time of Discharge  Physical Exam  Constitutional:       General: She is not in acute distress.     Appearance: She is not toxic-appearing.      Comments: Contracted right upper arm  Thin and frail     HENT:      Mouth/Throat:      Mouth: Mucous membranes are moist.    Eyes:      Extraocular Movements: Extraocular movements intact.      Pupils: Pupils are equal, round, and reactive to light.   Cardiovascular:      Rate and Rhythm: Normal rate and regular rhythm.      Pulses: Normal pulses.      Heart sounds: Normal heart sounds.   Pulmonary:      Effort: Pulmonary effort is normal.      Breath sounds: Normal breath sounds.   Abdominal:      General: Bowel sounds are normal.      Palpations: Abdomen is soft.   Musculoskeletal:      Comments: Using device for mobility, denies pain   Skin:     General: Skin is warm and dry.      Capillary Refill: Capillary refill takes less than 2 seconds.   Neurological:      General: No focal deficit present.      Mental Status: She is alert and oriented to person, place, and time.   Psychiatric:         Mood and Affect: Mood normal.         Behavior: Behavior normal.       Outpatient Follow-Up  Future Appointments   Date Time Provider Department Center   12/6/2023  1:00 PM GEN [facility] NONV1 ECG [facility] GEN Steffanie Aguilar, APRN-CNP

## 2023-12-07 VITALS
WEIGHT: 84.66 LBS | RESPIRATION RATE: 18 BRPM | HEART RATE: 83 BPM | HEIGHT: 61 IN | BODY MASS INDEX: 15.98 KG/M2 | SYSTOLIC BLOOD PRESSURE: 95 MMHG | DIASTOLIC BLOOD PRESSURE: 55 MMHG | OXYGEN SATURATION: 100 % | TEMPERATURE: 98.2 F

## 2023-12-07 PROCEDURE — 99232 SBSQ HOSP IP/OBS MODERATE 35: CPT | Performed by: NURSE PRACTITIONER

## 2023-12-07 PROCEDURE — 2500000004 HC RX 250 GENERAL PHARMACY W/ HCPCS (ALT 636 FOR OP/ED): Mod: IPSPLIT | Performed by: NURSE PRACTITIONER

## 2023-12-07 RX ADMIN — PANTOPRAZOLE SODIUM 40 MG: 40 TABLET, DELAYED RELEASE ORAL at 06:58

## 2023-12-07 RX ADMIN — ACETAMINOPHEN 975 MG: 325 TABLET ORAL at 06:58

## 2023-12-07 RX ADMIN — SERTRALINE HYDROCHLORIDE 150 MG: 50 TABLET ORAL at 08:20

## 2023-12-07 ASSESSMENT — COGNITIVE AND FUNCTIONAL STATUS - GENERAL
MOVING TO AND FROM BED TO CHAIR: A LITTLE
HELP NEEDED FOR BATHING: A LITTLE
DRESSING REGULAR LOWER BODY CLOTHING: A LOT
DAILY ACTIVITIY SCORE: 17
STANDING UP FROM CHAIR USING ARMS: A LOT
TOILETING: A LITTLE
CLIMB 3 TO 5 STEPS WITH RAILING: TOTAL
EATING MEALS: A LITTLE
PERSONAL GROOMING: A LITTLE
WALKING IN HOSPITAL ROOM: A LOT
DRESSING REGULAR UPPER BODY CLOTHING: A LITTLE
MOBILITY SCORE: 16

## 2023-12-07 ASSESSMENT — PAIN DESCRIPTION - ORIENTATION: ORIENTATION: RIGHT

## 2023-12-07 ASSESSMENT — PAIN DESCRIPTION - LOCATION: LOCATION: HIP

## 2023-12-07 ASSESSMENT — PAIN SCALES - GENERAL: PAINLEVEL_OUTOF10: 0 - NO PAIN

## 2023-12-07 ASSESSMENT — PAIN - FUNCTIONAL ASSESSMENT
PAIN_FUNCTIONAL_ASSESSMENT: 0-10
PAIN_FUNCTIONAL_ASSESSMENT: 0-10

## 2023-12-07 NOTE — PROGRESS NOTES
Polina Wilcox is a 83 y.o. female on day 4 of admission presenting with Pelvic fracture (CMS/HCC).      Subjective   Patient assessed at bedside; sitting up in bed. She was suppose to discharge yesterday; but [facility] kept pushing back her  time. She is still waiting to be . She did refuse to be transported at 0400.       Objective     Last Recorded Vitals  BP 95/55 (BP Location: Left arm, Patient Position: Lying)   Pulse 83   Temp 36.8 °C (98.2 °F) (Temporal)   Resp 18   Wt (!) 38.4 kg (84 lb 10.5 oz)   SpO2 100%   Intake/Output last 3 Shifts:    Intake/Output Summary (Last 24 hours) at 12/7/2023 0917  Last data filed at 12/6/2023 1800  Gross per 24 hour   Intake 720 ml   Output 400 ml   Net 320 ml       Admission Weight  Weight: (!) 38.1 kg (84 lb) (12/03/23 1316)    Daily Weight  12/03/23 : (!) 38.4 kg (84 lb 10.5 oz)    Image Results  ECG 12 lead  Normal sinus rhythm  Normal ECG  When compared with ECG of 06-NOV-2022 10:31,  Previous ECG has undetermined rhythm, needs review      Physical Exam  Vitals reviewed.   Constitutional:       Appearance: Normal appearance.      Comments: cachexia   HENT:      Head: Normocephalic and atraumatic.      Nose: Nose normal.      Mouth/Throat:      Mouth: Mucous membranes are moist.      Pharynx: Oropharynx is clear.   Eyes:      Pupils: Pupils are equal, round, and reactive to light.   Cardiovascular:      Rate and Rhythm: Normal rate and regular rhythm.      Pulses: Normal pulses.      Heart sounds: Normal heart sounds.   Pulmonary:      Effort: Pulmonary effort is normal.      Breath sounds: Normal breath sounds.   Abdominal:      General: Abdomen is flat. Bowel sounds are normal.      Palpations: Abdomen is soft.   Musculoskeletal:         General: Normal range of motion.      Cervical back: Normal range of motion and neck supple.   Neurological:      Mental Status: She is alert and oriented to person, place, and time.   Psychiatric:         Mood and  Affect: Mood normal.         Behavior: Behavior normal.         Relevant Results    Scheduled medications  acetaminophen, 975 mg, oral, q8h  heparin (porcine), 5,000 Units, subcutaneous, q8h  pantoprazole, 40 mg, oral, Daily before breakfast   Or  pantoprazole, 40 mg, intravenous, Daily before breakfast  pravastatin, 40 mg, oral, Nightly  sennosides, 2 tablet, oral, BID  sertraline, 150 mg, oral, Daily      Continuous medications     PRN medications  PRN medications: ondansetron **OR** ondansetron, oxyCODONE, polyethylene glycol    Results for orders placed or performed during the hospital encounter of 12/03/23 (from the past 24 hour(s))   ECG 12 lead   Result Value Ref Range    Ventricular Rate 81 BPM    Atrial Rate 81 BPM    DC Interval 122 ms    QRS Duration 72 ms    QT Interval 408 ms    QTC Calculation(Bazett) 473 ms    P Axis 59 degrees    R Axis 26 degrees    T Axis 57 degrees    QRS Count 13 beats    Q Onset 226 ms    P Onset 165 ms    P Offset 213 ms    T Offset 430 ms    QTC Fredericia 450 ms                   Assessment/Plan        Principal Problem:    Pelvic fracture (CMS/HCC)  Active Problems:    Rhabdomyolysis    DUSTY (acute kidney injury) (CMS/HCC)    Lactic acidosis    Mixed hyperlipidemia    Anxiety     Right pelvic fracture s/p mechanical fall  Rhabdomyolysis  DUSTY (improving)   Lactic acidosis (resolved)   - Patient slipped and fell in her shower; denies hitting her head or loss of consciousness. Not on AC   - XR: Comminuted displaced fracture of the right iliac bone. Further   evaluation with pelvic CT is suggested. No definite bilateral hip fracture or right knee fracture. 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   - CT pelvis w/o contrast: There is a comminuted fracture identified involving the anterior aspect of right pelvic wing. No additional fracture is seen.   - >360 - good po intake will discontinue MIV  - Lactate 2.8 > 2.3 > 2.0  - Cr 1.25 >  1.14>1.06  - Discontinue NS at 100 ml/hr - received a total of 1500 ml NS in the ED, MIV @100ml/hr the following day  - Avoid nephrotoxic meds/renally dose as needed  - Trend CK, BMP daily   - monitor UOP   - Tylenol 975 mg PO q8h scheduled- pain currently controlled  - Oxycodone 5 mg PO q6h PRN for severe pain 7-10  - Senokot for prevention of constipation while on opiates   - PT/OT evals- recommending moderate intensity therapy on discharge   - TCC for discharge planning/SNF placement- patient from home alone   - Orthopedics consulted, appreciate recs      HLD  - Continue pravastatin      Anxiety    - Continue sertraline     DVT PPx: Heparin   GI PPx: Protonix      Code Status: Full code      Disposition: Patient is stable to be transferred to SNF. Awaiting  [facility] for transport.    Total accumulated time spent face to face and not face to face preparing to see the patient, obtaining and reviewing separately obtained history; performing a medically appropriate examination and/or evaluation; counseling and educating the patient, family; ordering medications, tests, or procedures; referring and communicating with other health care professionals; documenting clinical information in the patient's medical record; independently interpreting results and communicating the results to the patient, family; and care coordination was 25 minutes.               LEIGHANN Charlton-CNP

## 2023-12-07 NOTE — CARE PLAN
The patient's goals for the shift include be discharged to rehab     The clinical goals for the shift include Pt will be free of injury during shift    Over the shift, the patient was not discharged to [facility] due to [facility] rescheduling her  until 0430 am. Pt was up to chair and rings appropriatley for stand by assistance to ambulate. V/s stable.

## 2023-12-07 NOTE — NURSING NOTE
12/7/23 0710: Assumed care of pt     0730: Verified with [facility] for ETA of 1030 for discharge.     0820: Meds passed. Patient refused stool softner. Education provided.    0900: Assessment completed. Patient denies pain.     1000: Updated [facility] of ETA.    1107: Patient discharged at this time. [facility] provided transportation via stretcher. Patient left with all belongings.

## 2023-12-07 NOTE — PROGRESS NOTES
[facility] delayed transportation yesterday and rescheduled for this morning at 10:30. Avoidable days documented. IMM letter given and explained to patient. Consent/signature obtained and copy given to patient. Plan for discharge today. SNF has been made aware.

## 2023-12-08 ENCOUNTER — DOCUMENTATION (OUTPATIENT)
Dept: PRIMARY CARE | Facility: CLINIC | Age: 83
End: 2023-12-08
Payer: MEDICARE

## 2023-12-08 NOTE — PROGRESS NOTES
Per review of [facility] admissions, patient was admitted to [facility] for fractured pelvis after a fall at home. Admitted 12/3-12/7 with discharge to [facility] for skilled care. This  was scheduled to contact patient on 12/18 for monthly outreach. Will monitor patient's progress & follow up when patient is discharged.

## 2023-12-11 ENCOUNTER — DOCUMENTATION (OUTPATIENT)
Dept: PRIMARY CARE | Facility: CLINIC | Age: 83
End: 2023-12-11
Payer: MEDICARE

## 2023-12-11 NOTE — PROGRESS NOTES
Per review of medical records in [facility], patient remains in [facility] for skilled services & therapy following fractured pelvis. Based on this information, unable to do scheduled monthly outreach & will follow progress & check on patient's status within the next 2 weeks.

## 2024-01-02 ENCOUNTER — PATIENT OUTREACH (OUTPATIENT)
Dept: PRIMARY CARE | Facility: CLINIC | Age: 84
End: 2024-01-02
Payer: MEDICARE

## 2024-01-02 NOTE — PROGRESS NOTES
Attempted to reach patient & patient's contact listed for update due to recent inpatient admission with discharge to [facility] for rehab. Left voicemail messages on both numbers. Per review of [facility], patient appears to be at skilled rehab after admission there on 12/7/23. If no response, will follow up within 2 weeks to confirms status.

## 2024-01-09 PROBLEM — U07.1 COVID-19: Status: RESOLVED | Noted: 2024-01-09 | Resolved: 2024-01-09

## 2024-01-09 PROBLEM — N17.9 AKI (ACUTE KIDNEY INJURY) (CMS-HCC): Status: RESOLVED | Noted: 2023-12-06 | Resolved: 2024-01-09

## 2024-01-09 PROBLEM — F42.9 OBSESSIVE-COMPULSIVE DISORDER: Status: ACTIVE | Noted: 2024-01-09

## 2024-01-09 PROBLEM — M62.82 RHABDOMYOLYSIS: Status: RESOLVED | Noted: 2023-12-06 | Resolved: 2024-01-09

## 2024-01-09 PROBLEM — U07.1 COVID-19: Status: ACTIVE | Noted: 2024-01-09

## 2024-01-09 PROBLEM — S22.000A THORACIC COMPRESSION FRACTURE (MULTI): Status: ACTIVE | Noted: 2020-06-08

## 2024-01-09 PROBLEM — F41.8 MIXED ANXIETY DEPRESSIVE DISORDER: Status: ACTIVE | Noted: 2024-01-09

## 2024-01-09 PROBLEM — E87.20 LACTIC ACIDOSIS: Status: RESOLVED | Noted: 2023-12-06 | Resolved: 2024-01-09

## 2024-01-09 PROBLEM — S32.9XXA PELVIC FRACTURE (MULTI): Status: RESOLVED | Noted: 2023-12-03 | Resolved: 2024-01-09

## 2024-01-09 PROBLEM — S22.000A THORACIC COMPRESSION FRACTURE (MULTI): Status: RESOLVED | Noted: 2020-06-08 | Resolved: 2024-01-09

## 2024-01-09 PROBLEM — G80.9 CEREBRAL PALSY (MULTI): Status: ACTIVE | Noted: 2024-01-09

## 2024-01-09 PROBLEM — S22.009A CLOSED FRACTURE OF THORACIC VERTEBRA (MULTI): Status: ACTIVE | Noted: 2024-01-09

## 2024-01-09 PROBLEM — R73.01 IMPAIRED FASTING GLUCOSE: Status: ACTIVE | Noted: 2024-01-09

## 2024-01-09 PROBLEM — H26.499 PCO (POSTERIOR CAPSULAR OPACIFICATION): Status: ACTIVE | Noted: 2024-01-09

## 2024-01-09 NOTE — PROGRESS NOTES
Subjective   Patient ID: Polina Wilcox is a 83 y.o. female who presents for follow up pelvic fracture.    HPI   Pt comes in after discharge from rehab for a closed fracture of the right pelvis.  She was in [facility] from 12/3 to 12/7 after presenting to ER by 911 after falling in her shower. No LOC. Found to have a comminuted displaced fx of right iliac bone.  Ortho saw her and decided to tx fx nonoperatively.  No hip fxs. PT/OT consulted.  PT did have DUSTY which resolved with fluids.      She got home a week ago and it's been hectic arranging all the visits she needs for pt/ot and getting her shower fixed so it has a hand held shower vs one that comes directly overhead.  She has no grab bars since [place] a trailor.  She had a suction cup one and it fell off when she fell the first time.  She says she is feeling quite well and yearns for some alone time since she's been surrounded by people for over a month now!    Past Medical History:   Diagnosis Date    Anxiety and depression     Cerebral palsy (CMS/[facility])     right sided    COVID-19 01/2022    Fasting hyperglycemia     Hyperlipidemia     OCD (obsessive compulsive disorder)     Osteoporosis 2020    h/o of T11 fx; reclast 2014 to 2021; now on prolia    Pelvic fracture (CMS/[facility]) 12/03/2023    Thoracic compression fracture (CMS/[facility]) 06/08/2020     Current Outpatient Medications   Medication Sig Dispense Refill    acetaminophen (Tylenol Extra Strength) 500 mg tablet every 6 hours.      denosumab (Prolia) 60 mg/mL syringe as directed Subcutaneous      pravastatin (Pravachol) 40 mg tablet Take 1 tablet (40 mg) by mouth once daily at bedtime.      sertraline (Zoloft) 100 mg tablet Take 1.5 tablets (150 mg) by mouth once daily.      sertraline (Zoloft) 50 mg tablet Take 1 tablet (50 mg) by mouth once daily.       No current facility-administered medications for this visit.       Review of Systems see hpi    Objective   /60 (BP Location: Left arm)   Pulse 91    Wt (!) 38.1 kg (84 lb)   SpO2 98%   BMI 15.87 kg/m²     Physical Exam  Constitutional:       Appearance: Normal appearance.   HENT:      Head: Normocephalic and atraumatic.   Cardiovascular:      Rate and Rhythm: Normal rate and regular rhythm.   Pulmonary:      Effort: Pulmonary effort is normal.      Breath sounds: Normal breath sounds.   Neurological:      Mental Status: She is alert.      Deep Tendon Reflexes: Reflexes are normal and symmetric.         Assessment/Plan   Problem List Items Addressed This Visit             ICD-10-CM       Medium    Cerebral palsy (CMS/[facility]) G80.9     Other Visit Diagnoses         Codes    Hospital discharge follow-up    -  Primary Z09    Closed displaced fracture of right ilium, unspecified fracture morphology, initial encounter (CMS/[facility])     S32.301A    cont with ot/pt at home     DUSTY (acute kidney injury) (CMS/[facility])     N17.9    resolved     Traumatic rhabdomyolysis, initial encounter (CMS/[facility])     T79.6XXA    resolved     Lactic acidosis     E87.20    resolved     Anemia, unspecified type     D64.9    hold iron pill until test results.     Relevant Orders    Hemoglobin and Hematocrit, Blood    Ferritin    Encounter for preprocedural laboratory examination     Z01.812    if calcium is normal, will schedule prolia for after 1/25/24     Relevant Orders    Calcium             Patient was identified as a fall risk. Risk prevention instructions provided.

## 2024-01-11 RX ORDER — TIZANIDINE 2 MG/1
TABLET ORAL EVERY 8 HOURS
COMMUNITY
Start: 2022-02-15 | End: 2024-01-12 | Stop reason: WASHOUT

## 2024-01-11 RX ORDER — RALOXIFENE HYDROCHLORIDE 60 MG/1
60 TABLET, FILM COATED ORAL
COMMUNITY
End: 2024-01-12 | Stop reason: WASHOUT

## 2024-01-11 RX ORDER — ACETAMINOPHEN 500 MG
TABLET ORAL EVERY 6 HOURS
COMMUNITY

## 2024-01-11 RX ORDER — DENOSUMAB 60 MG/ML
INJECTION SUBCUTANEOUS
COMMUNITY

## 2024-01-11 RX ORDER — SIMVASTATIN 40 MG/1
40 TABLET, FILM COATED ORAL NIGHTLY
COMMUNITY
End: 2024-01-12 | Stop reason: WASHOUT

## 2024-01-12 ENCOUNTER — LAB (OUTPATIENT)
Dept: LAB | Facility: LAB | Age: 84
End: 2024-01-12
Payer: MEDICARE

## 2024-01-12 ENCOUNTER — OFFICE VISIT (OUTPATIENT)
Dept: PRIMARY CARE | Facility: CLINIC | Age: 84
End: 2024-01-12
Payer: MEDICARE

## 2024-01-12 VITALS
HEART RATE: 91 BPM | DIASTOLIC BLOOD PRESSURE: 60 MMHG | OXYGEN SATURATION: 98 % | BODY MASS INDEX: 15.87 KG/M2 | WEIGHT: 84 LBS | SYSTOLIC BLOOD PRESSURE: 124 MMHG

## 2024-01-12 DIAGNOSIS — Z09 HOSPITAL DISCHARGE FOLLOW-UP: Primary | ICD-10-CM

## 2024-01-12 DIAGNOSIS — Z01.812 ENCOUNTER FOR PREPROCEDURAL LABORATORY EXAMINATION: ICD-10-CM

## 2024-01-12 DIAGNOSIS — D64.9 ANEMIA, UNSPECIFIED TYPE: ICD-10-CM

## 2024-01-12 DIAGNOSIS — N17.9 AKI (ACUTE KIDNEY INJURY) (CMS-HCC): ICD-10-CM

## 2024-01-12 DIAGNOSIS — T79.6XXA TRAUMATIC RHABDOMYOLYSIS, INITIAL ENCOUNTER (CMS-HCC): ICD-10-CM

## 2024-01-12 DIAGNOSIS — E87.20 LACTIC ACIDOSIS: ICD-10-CM

## 2024-01-12 DIAGNOSIS — S32.301A: ICD-10-CM

## 2024-01-12 DIAGNOSIS — G80.9 CEREBRAL PALSY, UNSPECIFIED TYPE (MULTI): ICD-10-CM

## 2024-01-12 LAB
HCT VFR BLD AUTO: 38.7 % (ref 36–46)
HGB BLD-MCNC: 11.6 G/DL (ref 12–16)

## 2024-01-12 PROCEDURE — 99214 OFFICE O/P EST MOD 30 MIN: CPT | Performed by: FAMILY MEDICINE

## 2024-01-12 PROCEDURE — 1159F MED LIST DOCD IN RCRD: CPT | Performed by: FAMILY MEDICINE

## 2024-01-12 PROCEDURE — 1036F TOBACCO NON-USER: CPT | Performed by: FAMILY MEDICINE

## 2024-01-12 PROCEDURE — 1126F AMNT PAIN NOTED NONE PRSNT: CPT | Performed by: FAMILY MEDICINE

## 2024-01-12 PROCEDURE — 36415 COLL VENOUS BLD VENIPUNCTURE: CPT

## 2024-01-12 RX ORDER — SERTRALINE HYDROCHLORIDE 50 MG/1
50 TABLET, FILM COATED ORAL DAILY
COMMUNITY

## 2024-01-12 RX ORDER — CALCIUM CARB/VITAMIN D3/VIT K1 650MG-12.5
TABLET,CHEWABLE ORAL
COMMUNITY
Start: 2023-09-04 | End: 2024-01-12 | Stop reason: WASHOUT

## 2024-01-12 ASSESSMENT — ENCOUNTER SYMPTOMS
DEPRESSION: 0
LOSS OF SENSATION IN FEET: 0
OCCASIONAL FEELINGS OF UNSTEADINESS: 1

## 2024-01-12 ASSESSMENT — PATIENT HEALTH QUESTIONNAIRE - PHQ9
1. LITTLE INTEREST OR PLEASURE IN DOING THINGS: NOT AT ALL
SUM OF ALL RESPONSES TO PHQ9 QUESTIONS 1 AND 2: 0
2. FEELING DOWN, DEPRESSED OR HOPELESS: NOT AT ALL

## 2024-01-12 ASSESSMENT — PAIN SCALES - GENERAL: PAINLEVEL: 0-NO PAIN

## 2024-01-12 NOTE — PATIENT INSTRUCTIONS
PAIN MANAGEMENT IN THE ELDERLY  What is pain? Pain is how your body reacts to injury or illness, even as you get older. Pain is not something that normally happens as you age. What you think is painful may not be painful to someone else. Everybody reacts to pain in different ways. But, pain is whatever you say it is! No matter how mild or strong your pain is, you have the right to be “comfortable”. But you need to tell your caregiver that you have pain, so together you can work on treatment options.   The following are some things that are NOT true about getting older and pain.  I should expect to have pain because it is just a part of getting older.  If I tell my caregiver about my pain, he will think I am complaining. You have the right just as anyone else does to be comfortable. Your caregiver cannot treat your pain if you do not tell him about it.  If I tell my caregiver about my pain he will not listen or take me seriously.  If I take pain medicines, I will feel “drugged up” or “doped up”. There are many new medicines now that lessen pain without making you feel “drugged up” when taking them.  Older people should only take pain medicines if their pain is very bad. You have the right to be comfortable no matter if you have a little or a lot of pain.  Pain medicines are addictive and if I take them I will get “hooked”. An addicted person is someone who takes medicines to “get high”. You cannot get addicted to pain medicines if you are taking them to make your pain go away. A person who has diabetes takes their medicine to keep the diabetes I control. It is the same with your pain. You take pain medicines to keep your pain in control and to live a normal life.  What causes pain? Pain can be caused by many things, such as injury, surgery, or disease. Some pain is caused by pressure on a nerve, such as a cancerous tumor. Other pain is caused when nerves are cut as I an accident or surgery. Old injuries that may not  have bothered you may get re?injured, or cause new injuries. You may not want to move the painful part of your body at all. But, you may have pain because you are not moving this body part. But sometimes there is no clear cause for pain.    What are the different types of pain?  Acute pain is short?lived and usually lasts less than 3 months. Caregivers first work to remove the cause of the pain, such fixing a broken arm or leg. Acute pain can usually be controlled or stopped with pain medicine.  Chronic pain lasts longer than 3 to 6 months. Almost 4 out of 5 persons over 65 regularly take medications for chronic pain, and half of this group has seen more than 3 doctors in the past 5 years. This kind of pain is often more complex. Caregivers may use medicines along with other treatments, like physical and relaxation therapies to help your pain.    What is your pain like? Caregivers want you to talk to them about your pain. This helps them learn what may be causing the pain and how to best treat it. You need to tell your caregivers if you have trouble hearing their questions or seeing things. Caregivers can use special tools and ways to better understand their questions about your pain.    What if I cannot talk? Sometimes you may not be able to speak about your pain. You may have illness or injuries like dementia, brain damage, or a stroke. This makes it very hard for your caregivers and family to know you are in pain. Your family may help caregivers understand your pain by watching for physical signs of pain. This means you may act normal or opposite of how your family thinks you should act even though you are having very bad pain.     The following are some signs that your family can watch for that may tell them you are in pain.  If you are normally loud and noisy, you may get very quiet and withdrawn. You may also stop doing activities you used to do.  If you are very quiet and withdrawn, you may get loud, act  stubborn, and hit people.  You may not eat what you normally do. Or, you may only want to drink or eat soft foods.  Suddenly, you may not do all the activities you used to do.  You may lose control of your bowel or bladder.  You may be very depressed and have a sad face.  If you do not talk at all, you may blink your eyes very fast much of the time. You may also grimace.  If you have been very easy going and happy, you may begin to be very sensitive and cry easily.  You might start to walk or move differently than before. You may suddenly stop walking or start pacing all the time.  You may have your knees drawn up to your chest and rock like a baby.  You may touch, rub, pull or pick at a body part that is hurting.  You may start to pull away from peoples’ touch and protect your arms and legs.  You may suddenly begin to fall when you had no problems before.  You may sleep more than usual.  You may start to whimper or groan quietly.  You may become very confused suddenly when there was no problem before.  Why is pain control important? Pain can affect your appetite, how well you sleep, your energy level and ability to do things. Pain can also affect your mood and relationship with others. If caregivers can help you control your pain, you will suffer less and can even heal faster.  Medicines:  Anti?anxiety medicine: this medicine may be given to help you feel less nervous. It may be given by IV, as a shot/injection or by mouth.  Anti?convulsing: this medicine is given to control seizures. It can also be used to treat kinds of chronic nerve pain and may help control your mood swings. It is given by IV, shot/injection or by mouth.  Muscle relaxers: you may need medicine to help your muscles relax. This medicine can be given by IV, shot/injection or by mouth. Muscle relaxers can make you feel dizzy or weak. Call your caregiver if you need help getting out of bed.  NSAIDS: this medicine may be given to decrease  inflammation, which is redness, pain and swelling. It is very good for chronic bone pain that comes from arthritis or cancer. It is given by mouth or inhaled in your nose.  Pain medicine: this medicine affects the nervous system so you feel less pain. Your caregiver will tell you how much to take and how often. Take the medicine regularly as directed by your  caregiver. Do not wait until the pain is too bad. The medicine may not work as well at controlling the pain if you wait too long to take it. Tell caregivers if the pain does not go away or comes back.  Steroids: this medicine may be given to decrease inflammation. There are many different reasons to take steroids. This medication can help a lot but may also have side effects. Be sure  you understand why you need steroids. Don’t stop taking this medicine without your caregiver’s ok. Stopping on your own can cause problems.  Tell your caregiver all medications that you are taking, including over the counter meds and herbals.    How can pain medicine be given? The following are the many different ways pain medicine can be given depending on the kind of pain you are having.  By mouth - you may be given pills or liquid to swallow or you may be given a pill or liquid to put under your tongue. Some medicine can also be given as a lozenge like a cough drop or even as a special lollipop.  Rectal - medicine in a suppository is put into your rectum.  IV - pain medicine can be given as a shot/injection in an IV, into a muscle, or under the skin.  Transdermal - some medicine can be given as a patch that is placed on your skin. This medicine is released slowly to give pain relief for as long as 72 hours.  How can you take pain medicine safely and make it work best for you?  DO NOT wait until you are in pain to take your medicine if your caregiver has suggested a regular schedule around the clock. If you wait until you feel pain, you will only be “chasing” your pain and not  controlling it.  Some pain medicines can make you breathe less deeply and less often. The medicine may also make you sleepy, dizzy, and unsafe to drive a car or use heavy equipment. For these reasons, it is very important to follow your caregiver’s advice on how to use this medicine.  Some foods, alcohol and other medicines may cause unpleasant side effects when you take pain medicine. Follow your caregiver’s advice about how to prevent these problems.  Pain medicine and NSAIDS can make you constipated. Contact your caregiver if you are experiencing constipation.  Do not stop talking pain medicine suddenly if you have been taking it longer than 2 weeks. Your body may have become used to the medicine. Stopping the medicine all at once may cause unpleasant or dangerous side effects.  With time you may feel that the pain medicine is not working as well as it did before. Call your caregiver if this happens. Together you can find new ways to control the pain.    Other non?drug control methods: there are many pain control techniques that can help you deal with pain even if it does not go away completely. It is important to practice some of the techniques whe you do not have pain if possible. This will help the technique work well during an attack of pain.  Breathing exercises.  Environment: being in a quiet place may make it easier for you to deal with the pain. Avoiding bright lights or loud, noisy places can also help control the pain. Making sure your home is not too hot or too cold may also lessen pain.         Ways to Help Prevent Falls at Home    Quick Tips   ? Ask for help if you need it. Most people want to help!   ? Get up slowly after sitting or laying down   ? Wear a medical alert device or keep cell phone in your pocket   ? Use night lights, especially areas near a bathroom   ? Keep the items you use often within reach on a small stool or end table   ? Use an assistive device such as walker or cane, as  directed by provider/physical therapy   ? Use a non-slip mat and grab bars in your bathroom. Look for home health sections for best options     Other Areas to Focus On   ? Exercise and nutrition: Regular exercise or taking a falls prevention class are great ways improve strength and balance. Don’t forget to stay hydrated and bring a snack!   ? Medicine side effects: Some medicines can make you sleepy or dizzy, which could cause a fall. Ask your healthcare provider about the side effects your medicines could cause. Be sure to let them know if you take any vitamins or supplements as well.   ? Tripping hazards: Remove items you could trip on, such as loose mats, rugs, cords, and clutter. Wear closed toe shoes with rubber soles.   ? Health and wellness: Get regular checkups with your healthcare provider, plus routine vision and hearing screenings. Talk with your healthcare provider about:   o Your medicines and the possible side effects - bring them in a bag if that is easier!   o Problems with balance or feeling dizzy   o Ways to promote bone health, such as Vitamin D and calcium supplements   o Questions or concerns about falling     *Ask your healthcare team if you have questions     ©[facility], 2022

## 2024-01-13 LAB
CALCIUM SERPL-MCNC: 9.3 MG/DL (ref 8.6–10.3)
FERRITIN SERPL-MCNC: 39 NG/ML (ref 8–150)

## 2024-01-13 NOTE — RESULT ENCOUNTER NOTE
Calcium level is normal so schedule prolia shot for after Jan 25th I think?  She had last shot on 7/25?

## 2024-01-15 ENCOUNTER — PATIENT OUTREACH (OUTPATIENT)
Dept: PRIMARY CARE | Facility: CLINIC | Age: 84
End: 2024-01-15
Payer: MEDICARE

## 2024-01-15 DIAGNOSIS — S32.301A: ICD-10-CM

## 2024-01-15 DIAGNOSIS — N17.9 AKI (ACUTE KIDNEY INJURY) (CMS-HCC): ICD-10-CM

## 2024-01-15 DIAGNOSIS — G80.9 CEREBRAL PALSY, UNSPECIFIED TYPE (MULTI): ICD-10-CM

## 2024-01-15 PROCEDURE — 99490 CHRNC CARE MGMT STAFF 1ST 20: CPT | Performed by: FAMILY MEDICINE

## 2024-01-15 ASSESSMENT — ENCOUNTER SYMPTOMS
LOSS OF SENSATION IN FEET: 0
OCCASIONAL FEELINGS OF UNSTEADINESS: 1
DEPRESSION: 0

## 2024-01-15 NOTE — PROGRESS NOTES
Contacted patient for monthly outreach after discharge from [facility] as a result of a pelvic fracture. Admitted to [facility] 12/3 for care after falling at home & sustaining a pelvic fracture. [facility] with discharge 1/6/24. [facility] in place, nurse visited today. Therapy scheduled to see patient tomorrow & Thursday. States is doing well & expecting cousin from [place] today who is bringing lunch. Patient has home health aide in place to assist with daily needs like showering. Lives in a mobile home & in process of getting adjustable shower head installed to increase safety. Admits to falling again the day of discharge & was able to get up without help. States balance is off & is increasing awareness to move slowly especially when fatigued & use walker. Limited family assistance but friends visiting daily with meals & any other needs patient has. Had follow up with primary care on 1/12, friend drove to appointment. Patient's goal is to slowly gain independence & start driving when able. Maintains positive outlook, has adjustable bed & lift chair to assist with needs. Agreeable to follow up calls & has contact information for this .

## 2024-01-19 PROBLEM — M54.30 SCIATICA: Status: ACTIVE | Noted: 2024-01-19

## 2024-01-19 RX ORDER — ACETAMINOPHEN 500 MG
500 TABLET ORAL EVERY 6 HOURS
COMMUNITY

## 2024-01-19 RX ORDER — CALCIUM CARB/VITAMIN D3/VIT K1 500-500-40
TABLET,CHEWABLE ORAL EVERY 24 HOURS
COMMUNITY

## 2024-01-19 RX ORDER — PANTOPRAZOLE SODIUM 40 MG/1
40 TABLET, DELAYED RELEASE ORAL
COMMUNITY
Start: 2023-12-04

## 2024-01-26 ENCOUNTER — OFFICE VISIT (OUTPATIENT)
Dept: PRIMARY CARE | Facility: CLINIC | Age: 84
End: 2024-01-26
Payer: MEDICARE

## 2024-01-26 DIAGNOSIS — M81.0 AGE RELATED OSTEOPOROSIS, UNSPECIFIED PATHOLOGICAL FRACTURE PRESENCE: Primary | ICD-10-CM

## 2024-01-26 PROCEDURE — 1126F AMNT PAIN NOTED NONE PRSNT: CPT | Performed by: FAMILY MEDICINE

## 2024-01-26 PROCEDURE — 1036F TOBACCO NON-USER: CPT | Performed by: FAMILY MEDICINE

## 2024-01-26 PROCEDURE — 1159F MED LIST DOCD IN RCRD: CPT | Performed by: FAMILY MEDICINE

## 2024-02-13 ENCOUNTER — TELEPHONE (OUTPATIENT)
Dept: PRIMARY CARE | Facility: CLINIC | Age: 84
End: 2024-02-13

## 2024-02-13 ENCOUNTER — PATIENT OUTREACH (OUTPATIENT)
Dept: PRIMARY CARE | Facility: CLINIC | Age: 84
End: 2024-02-13
Payer: MEDICARE

## 2024-02-13 DIAGNOSIS — G80.9 CEREBRAL PALSY, UNSPECIFIED TYPE (MULTI): Primary | ICD-10-CM

## 2024-02-13 DIAGNOSIS — E78.5 HYPERLIPIDEMIA, UNSPECIFIED HYPERLIPIDEMIA TYPE: ICD-10-CM

## 2024-02-13 DIAGNOSIS — M81.0 AGE RELATED OSTEOPOROSIS, UNSPECIFIED PATHOLOGICAL FRACTURE PRESENCE: ICD-10-CM

## 2024-02-13 DIAGNOSIS — G80.9 CEREBRAL PALSY, UNSPECIFIED TYPE (MULTI): ICD-10-CM

## 2024-02-13 PROCEDURE — 99490 CHRNC CARE MGMT STAFF 1ST 20: CPT | Performed by: FAMILY MEDICINE

## 2024-02-13 NOTE — TELEPHONE ENCOUNTER
Patient requesting handicap placard renewal. Once completed patient request that it be mailed to her home. Home address verified

## 2024-02-13 NOTE — PROGRESS NOTES
Contacted patient for monthly outreach is reports is doing well. Receiving therapy 2x/week since discharge from [facility] for treatment for fractured pelvis. Is able to independently shower & increasing activity slowly. States tries to add 1 additional activity each day. Has approximately 2 more weeks of in-home therapy & feels confident of her ability to manage daily needs.  Recently was able to drive a short distance which was a personal goal. Had Prolia injection 1/26 & friend drove her to Podiatry appointment yesterday. Polina orders groceries from [employer] & is able to manage meal preparation without assistance. Uses walker when going out of the house & quad cane inside. No recent falls & knows the importance of extra safety precautions to remain safe. Taking all medications as prescribed & knows to contact physician for changes in current health status. Has my contact information for additional questions/concerns.

## 2024-03-15 ENCOUNTER — PATIENT OUTREACH (OUTPATIENT)
Dept: PRIMARY CARE | Facility: CLINIC | Age: 84
End: 2024-03-15
Payer: MEDICARE

## 2024-03-15 DIAGNOSIS — M81.0 OSTEOPOROSIS, UNSPECIFIED OSTEOPOROSIS TYPE, UNSPECIFIED PATHOLOGICAL FRACTURE PRESENCE: ICD-10-CM

## 2024-03-15 DIAGNOSIS — E78.5 HYPERLIPIDEMIA, UNSPECIFIED HYPERLIPIDEMIA TYPE: ICD-10-CM

## 2024-03-15 DIAGNOSIS — G80.9 CEREBRAL PALSY, UNSPECIFIED TYPE (MULTI): ICD-10-CM

## 2024-03-15 PROCEDURE — 99490 CHRNC CARE MGMT STAFF 1ST 20: CPT | Performed by: FAMILY MEDICINE

## 2024-03-15 NOTE — PROGRESS NOTES
Contacted patient for monthly outreach & states is doing well. Completed outpatient therapy related to previous history of fractured pelvis. Continues to use quad cane in home & walker when going out. No falls, had primary care visit end of January & is taking all medications as prescribed. Comes in every 6 months for Prolia injection, next one due in July. Has appointments scheduled for Podiatry & Opthalmology & know when to contact physician for change in current health. Has friends who call daily & assist with shopping & transportation as needed. Started driving again & was able to visit with friend who is currently at [facility]. Agreeable to monthly calls & has contact information for this  for questions/concerns.

## 2024-03-25 ENCOUNTER — TELEPHONE (OUTPATIENT)
Dept: PRIMARY CARE | Facility: CLINIC | Age: 84
End: 2024-03-25
Payer: MEDICARE

## 2024-03-25 DIAGNOSIS — M81.0 AGE-RELATED OSTEOPOROSIS WITHOUT CURRENT PATHOLOGICAL FRACTURE: Primary | ICD-10-CM

## 2024-03-25 RX ORDER — ALBUTEROL SULFATE 0.83 MG/ML
3 SOLUTION RESPIRATORY (INHALATION) AS NEEDED
OUTPATIENT
Start: 2024-07-29

## 2024-03-25 RX ORDER — DIPHENHYDRAMINE HYDROCHLORIDE 50 MG/ML
50 INJECTION INTRAMUSCULAR; INTRAVENOUS AS NEEDED
OUTPATIENT
Start: 2024-07-29

## 2024-03-25 RX ORDER — EPINEPHRINE 0.3 MG/.3ML
0.3 INJECTION SUBCUTANEOUS EVERY 5 MIN PRN
OUTPATIENT
Start: 2024-07-29

## 2024-03-25 RX ORDER — FAMOTIDINE 10 MG/ML
20 INJECTION INTRAVENOUS ONCE AS NEEDED
OUTPATIENT
Start: 2024-07-29

## 2024-03-25 NOTE — TELEPHONE ENCOUNTER
Patient received her PROLIA on 01/27/24- advised the change to [facility], pt would like to go to [facility]

## 2024-04-15 ENCOUNTER — PATIENT OUTREACH (OUTPATIENT)
Dept: PRIMARY CARE | Facility: CLINIC | Age: 84
End: 2024-04-15
Payer: MEDICARE

## 2024-04-15 DIAGNOSIS — E78.5 HYPERLIPIDEMIA, UNSPECIFIED HYPERLIPIDEMIA TYPE: ICD-10-CM

## 2024-04-15 DIAGNOSIS — G80.9 CEREBRAL PALSY, UNSPECIFIED TYPE (MULTI): ICD-10-CM

## 2024-04-15 DIAGNOSIS — M81.0 OSTEOPOROSIS, UNSPECIFIED OSTEOPOROSIS TYPE, UNSPECIFIED PATHOLOGICAL FRACTURE PRESENCE: ICD-10-CM

## 2024-04-15 PROCEDURE — 99490 CHRNC CARE MGMT STAFF 1ST 20: CPT | Performed by: FAMILY MEDICINE

## 2024-04-15 NOTE — PROGRESS NOTES
Contacted patient for monthly outreach & is doing well. Excited about opportunity to move to new senior apartment in [place] nearer to family. Is going out to see this afternoon & complete application process. No recent falls & feels apartment will be a safer place due to no stairs. Currently in 2 bedroom mobile home with some steps to front door. Taking all medications as prescribed & has next Prolia infusion scheduled at [facility], 7/24. Still drives short distanced & uses quad cane in home & walker when out. Knows when to contact primary care physician for changes to current health. Has contact information for this  for questions/concerns.

## 2024-05-14 ENCOUNTER — PATIENT OUTREACH (OUTPATIENT)
Dept: PRIMARY CARE | Facility: CLINIC | Age: 84
End: 2024-05-14
Payer: MEDICARE

## 2024-05-14 DIAGNOSIS — E78.5 HYPERLIPIDEMIA, UNSPECIFIED HYPERLIPIDEMIA TYPE: ICD-10-CM

## 2024-05-14 DIAGNOSIS — D64.9 ANEMIA, UNSPECIFIED TYPE: ICD-10-CM

## 2024-05-14 DIAGNOSIS — G80.9 CEREBRAL PALSY, UNSPECIFIED TYPE (MULTI): ICD-10-CM

## 2024-05-14 PROCEDURE — 99490 CHRNC CARE MGMT STAFF 1ST 20: CPT | Performed by: FAMILY MEDICINE

## 2024-05-14 NOTE — PROGRESS NOTES
Contacted patient for monthly outreach & is doing well. Excited about opportunity to move to new [facility] apartment in [place] nearer to family, moving day is June 3rd. Will be in a 1 bedroom ground level apartment. Patient owns current mobile home & will be renting it out to friends who will be moving in. Had Podiatry appointment yesterday & next Prolia injection scheduled at [facility] 7/24. No recent falls & taking all medication as prescribed.  Continues to drive short distances & uses quad cane in home & walker when out. Knows when to contact primary care physician for changes to current health. Will provide new address once move is completed. Cell phone number will remain the same, unsure if land line number will change. Has contact information for this  for questions/concerns.

## 2024-06-18 ENCOUNTER — PATIENT OUTREACH (OUTPATIENT)
Dept: PRIMARY CARE | Facility: CLINIC | Age: 84
End: 2024-06-18
Payer: MEDICARE

## 2024-06-18 DIAGNOSIS — E78.2 MIXED HYPERLIPIDEMIA: ICD-10-CM

## 2024-06-18 DIAGNOSIS — G80.9 CEREBRAL PALSY, UNSPECIFIED TYPE (MULTI): ICD-10-CM

## 2024-06-18 DIAGNOSIS — E78.5 HYPERLIPIDEMIA, UNSPECIFIED HYPERLIPIDEMIA TYPE: ICD-10-CM

## 2024-06-18 NOTE — PROGRESS NOTES
Contacted patient for monthly outreach & is doing well. [facility] [place] nearer to family. Now  in a 1 bedroom ground level apartment. Mobile home that patient owns rented to friends who will be moving in soon. No recent falls & taking all medication as prescribed.  Continues to drive short distances & purchased shopping cart which she uses to be more independent & mentioned that she drove to grocery store & was able to do her own shopping. Otherwise, uses quad cane in home & walker when needed. Updated demographics with new address & home phone number, cell number remains the same. Has contact information for this  for questions/concerns. Has Hematology appointment 7/24 & knows to contact physician for changes in current health. Agreeable to monthly outreach & has my contact information for questions/concerns.

## 2024-06-20 DIAGNOSIS — R73.01 IMPAIRED FASTING GLUCOSE: ICD-10-CM

## 2024-06-20 DIAGNOSIS — E78.2 MIXED HYPERLIPIDEMIA: Primary | ICD-10-CM

## 2024-06-24 ENCOUNTER — LAB (OUTPATIENT)
Dept: LAB | Facility: LAB | Age: 84
End: 2024-06-24
Payer: MEDICARE

## 2024-06-24 DIAGNOSIS — R73.01 IMPAIRED FASTING GLUCOSE: ICD-10-CM

## 2024-06-24 DIAGNOSIS — E78.2 MIXED HYPERLIPIDEMIA: ICD-10-CM

## 2024-06-24 LAB
CHOLEST SERPL-MCNC: 248 MG/DL (ref 0–199)
CHOLESTEROL/HDL RATIO: 2.5
HDLC SERPL-MCNC: 98.5 MG/DL
LDLC SERPL CALC-MCNC: 129 MG/DL
NON HDL CHOLESTEROL: 150 MG/DL (ref 0–149)
TRIGL SERPL-MCNC: 101 MG/DL (ref 0–149)
VLDL: 20 MG/DL (ref 0–40)

## 2024-06-24 PROCEDURE — 80061 LIPID PANEL: CPT

## 2024-06-24 PROCEDURE — 83036 HEMOGLOBIN GLYCOSYLATED A1C: CPT

## 2024-06-24 PROCEDURE — 36415 COLL VENOUS BLD VENIPUNCTURE: CPT

## 2024-06-25 LAB
EST. AVERAGE GLUCOSE BLD GHB EST-MCNC: 103 MG/DL
HBA1C MFR BLD: 5.2 %

## 2024-06-26 ENCOUNTER — TELEPHONE (OUTPATIENT)
Dept: PRIMARY CARE | Facility: CLINIC | Age: 84
End: 2024-06-26

## 2024-06-26 NOTE — TELEPHONE ENCOUNTER
Patient requesting creatine kinese order patient is due for prolia injection in July and will need this lab done prior please advise

## 2024-07-02 ENCOUNTER — LAB (OUTPATIENT)
Dept: LAB | Facility: LAB | Age: 84
End: 2024-07-02
Payer: MEDICARE

## 2024-07-22 ENCOUNTER — LAB (OUTPATIENT)
Dept: LAB | Facility: LAB | Age: 84
End: 2024-07-22
Payer: MEDICARE

## 2024-07-22 DIAGNOSIS — M81.0 AGE-RELATED OSTEOPOROSIS WITHOUT CURRENT PATHOLOGICAL FRACTURE: ICD-10-CM

## 2024-07-22 LAB
ALBUMIN SERPL BCP-MCNC: 4.1 G/DL (ref 3.4–5)
ALP SERPL-CCNC: 45 U/L (ref 33–136)
ALT SERPL W P-5'-P-CCNC: 10 U/L (ref 7–45)
ANION GAP SERPL CALC-SCNC: 10 MMOL/L (ref 10–20)
AST SERPL W P-5'-P-CCNC: 17 U/L (ref 9–39)
BILIRUB SERPL-MCNC: 0.3 MG/DL (ref 0–1.2)
BUN SERPL-MCNC: 23 MG/DL (ref 6–23)
CA-I BLD-SCNC: 1.16 MMOL/L (ref 1.1–1.33)
CALCIUM SERPL-MCNC: 9.2 MG/DL (ref 8.6–10.3)
CHLORIDE SERPL-SCNC: 106 MMOL/L (ref 98–107)
CO2 SERPL-SCNC: 31 MMOL/L (ref 21–32)
CREAT SERPL-MCNC: 1.05 MG/DL (ref 0.5–1.05)
EGFRCR SERPLBLD CKD-EPI 2021: 53 ML/MIN/1.73M*2
GLUCOSE SERPL-MCNC: 97 MG/DL (ref 74–99)
POTASSIUM SERPL-SCNC: 3.8 MMOL/L (ref 3.5–5.3)
PROT SERPL-MCNC: 7.1 G/DL (ref 6.4–8.2)
SODIUM SERPL-SCNC: 143 MMOL/L (ref 136–145)

## 2024-07-22 PROCEDURE — 82330 ASSAY OF CALCIUM: CPT

## 2024-07-22 PROCEDURE — 36415 COLL VENOUS BLD VENIPUNCTURE: CPT

## 2024-07-23 ENCOUNTER — PATIENT OUTREACH (OUTPATIENT)
Dept: PRIMARY CARE | Facility: CLINIC | Age: 84
End: 2024-07-23
Payer: MEDICARE

## 2024-07-23 DIAGNOSIS — E78.2 MIXED HYPERLIPIDEMIA: ICD-10-CM

## 2024-07-23 DIAGNOSIS — G80.9 CEREBRAL PALSY, UNSPECIFIED TYPE (MULTI): ICD-10-CM

## 2024-07-23 NOTE — PROGRESS NOTES
Contacted patient for monthly outreach & states is doing well. Getting used to new ground level apartment in [place]. Appointment tomorrow at [facility] for Prolia injection, had labs done yesterday. No recent falls, using quad cane or rollator as needed. Taking all medication as prescribed & knows when to contact PCP for changes in current health status.  Continues to drive short distances & made recent trip to to [facility] for groceries. Polina mentioned that the apartment has only 2 handicapped parking spaces & often she must walk significant distance. Has put in a request with the apartment for a reserved parking space & is waiting to hear. [facility] will order groceries online when weather changes. Agreeable to monthly outreach & has my contact information for additional questions/concerns.

## 2024-07-24 ENCOUNTER — INFUSION (OUTPATIENT)
Dept: HEMATOLOGY/ONCOLOGY | Facility: HOSPITAL | Age: 84
End: 2024-07-24
Payer: MEDICARE

## 2024-07-24 VITALS
DIASTOLIC BLOOD PRESSURE: 61 MMHG | RESPIRATION RATE: 18 BRPM | TEMPERATURE: 97.2 F | HEART RATE: 71 BPM | OXYGEN SATURATION: 96 % | SYSTOLIC BLOOD PRESSURE: 120 MMHG

## 2024-07-24 DIAGNOSIS — M81.0 AGE-RELATED OSTEOPOROSIS WITHOUT CURRENT PATHOLOGICAL FRACTURE: ICD-10-CM

## 2024-07-24 PROCEDURE — 96372 THER/PROPH/DIAG INJ SC/IM: CPT

## 2024-07-24 PROCEDURE — 2500000004 HC RX 250 GENERAL PHARMACY W/ HCPCS (ALT 636 FOR OP/ED): Mod: JZ,TB | Performed by: FAMILY MEDICINE

## 2024-07-24 RX ORDER — DIPHENHYDRAMINE HYDROCHLORIDE 50 MG/ML
50 INJECTION INTRAMUSCULAR; INTRAVENOUS AS NEEDED
OUTPATIENT
Start: 2025-01-18

## 2024-07-24 RX ORDER — FAMOTIDINE 10 MG/ML
20 INJECTION INTRAVENOUS ONCE AS NEEDED
OUTPATIENT
Start: 2025-01-18

## 2024-07-24 RX ORDER — EPINEPHRINE 0.3 MG/.3ML
0.3 INJECTION SUBCUTANEOUS EVERY 5 MIN PRN
OUTPATIENT
Start: 2025-01-18

## 2024-07-24 RX ORDER — ALBUTEROL SULFATE 0.83 MG/ML
3 SOLUTION RESPIRATORY (INHALATION) AS NEEDED
OUTPATIENT
Start: 2025-01-18

## 2024-07-24 ASSESSMENT — COLUMBIA-SUICIDE SEVERITY RATING SCALE - C-SSRS
6. HAVE YOU EVER DONE ANYTHING, STARTED TO DO ANYTHING, OR PREPARED TO DO ANYTHING TO END YOUR LIFE?: NO
2. HAVE YOU ACTUALLY HAD ANY THOUGHTS OF KILLING YOURSELF?: NO
1. IN THE PAST MONTH, HAVE YOU WISHED YOU WERE DEAD OR WISHED YOU COULD GO TO SLEEP AND NOT WAKE UP?: NO

## 2024-07-24 ASSESSMENT — PATIENT HEALTH QUESTIONNAIRE - PHQ9
SUM OF ALL RESPONSES TO PHQ9 QUESTIONS 1 & 2: 0
2. FEELING DOWN, DEPRESSED OR HOPELESS: NOT AT ALL
1. LITTLE INTEREST OR PLEASURE IN DOING THINGS: NOT AT ALL

## 2024-07-24 ASSESSMENT — PAIN SCALES - GENERAL: PAINLEVEL: 0-NO PAIN

## 2024-07-29 ENCOUNTER — TELEPHONE (OUTPATIENT)
Dept: PRIMARY CARE | Facility: CLINIC | Age: 84
End: 2024-07-29
Payer: MEDICARE

## 2024-07-29 NOTE — TELEPHONE ENCOUNTER
Patient is requesting a note for a reserved parking spot at her apartment complex as they will not accept her Handicap Placard.  She would like the note to be mailed to her home.

## 2024-07-30 DIAGNOSIS — E78.2 MIXED HYPERLIPIDEMIA: Primary | ICD-10-CM

## 2024-07-30 RX ORDER — PRAVASTATIN SODIUM 40 MG/1
40 TABLET ORAL DAILY
Qty: 90 TABLET | Refills: 3 | Status: SHIPPED | OUTPATIENT
Start: 2024-07-30

## 2024-07-30 NOTE — LETTER
July 30, 2024         Patient: Polina Wilcox   YOB: 1940                 To Whom It May Concern,    Due to my patient's underlying medical issues, having a reserved parking spot by her apartment building will be of great help to her.    Thank you!    Sincerely,     Luzmaria Richardson MD

## 2024-08-27 ENCOUNTER — PATIENT OUTREACH (OUTPATIENT)
Dept: PRIMARY CARE | Facility: CLINIC | Age: 84
End: 2024-08-27
Payer: MEDICARE

## 2024-08-27 DIAGNOSIS — G80.9 CEREBRAL PALSY, UNSPECIFIED TYPE (MULTI): ICD-10-CM

## 2024-08-27 DIAGNOSIS — M81.0 AGE RELATED OSTEOPOROSIS, UNSPECIFIED PATHOLOGICAL FRACTURE PRESENCE: ICD-10-CM

## 2024-08-27 DIAGNOSIS — E78.2 MIXED HYPERLIPIDEMIA: ICD-10-CM

## 2024-08-27 NOTE — PROGRESS NOTES
Contacted patient for monthly outreach & states is doing well. Still owrking on getting handicapped parking spot & received physician letter outlining need. States waiting on new  to process paperwork which may take about 6 weeks. No recent falls, using quad cane or rollator as needed. Had Prolia injection 7/24 at [facility], next one scheduled January, 2025. Taking all medication as prescribed & knows when to contact PCP for changes in current health status.  Continues to drive short distances & will take niece with her if getting large amount of groceries.  Does order groceries online when weather changes. Reminded to make appointment for Medicare Wellness visit & states will call & schedule, plus wants to get flu shot. Agreeable to monthly outreach & has my contact information for additional questions/concerns.

## 2024-09-23 DIAGNOSIS — F41.8 MIXED ANXIETY DEPRESSIVE DISORDER: Primary | ICD-10-CM

## 2024-09-24 RX ORDER — SERTRALINE HYDROCHLORIDE 100 MG/1
150 TABLET, FILM COATED ORAL DAILY
Qty: 135 TABLET | Refills: 3 | Status: SHIPPED | OUTPATIENT
Start: 2024-09-24

## 2024-09-25 ENCOUNTER — CLINICAL SUPPORT (OUTPATIENT)
Dept: PRIMARY CARE | Facility: CLINIC | Age: 84
End: 2024-09-25
Payer: MEDICARE

## 2024-09-25 DIAGNOSIS — Z23 NEED FOR INFLUENZA VACCINATION: ICD-10-CM

## 2024-09-25 PROCEDURE — 90662 IIV NO PRSV INCREASED AG IM: CPT | Performed by: FAMILY MEDICINE

## 2024-09-30 ENCOUNTER — PATIENT OUTREACH (OUTPATIENT)
Dept: PRIMARY CARE | Facility: CLINIC | Age: 84
End: 2024-09-30
Payer: MEDICARE

## 2024-09-30 DIAGNOSIS — E78.2 MIXED HYPERLIPIDEMIA: ICD-10-CM

## 2024-09-30 DIAGNOSIS — G80.9 CEREBRAL PALSY, UNSPECIFIED TYPE (MULTI): ICD-10-CM

## 2024-09-30 PROCEDURE — 99490 CHRNC CARE MGMT STAFF 1ST 20: CPT | Performed by: FAMILY MEDICINE

## 2024-09-30 NOTE — PROGRESS NOTES
Contacted patient for monthly outreach & reports is doing well. Was able to get flu shot from PCP office during drive through event on 9/25. Still working on getting handicapped parking spot & new  to process paperwork. No recent falls, using quad cane or rollator as needed. Had Prolia injection 7/24 at [facility], next one scheduled January, 2025. Taking all medications as prescribed & knows when to contact PCP for changes in current health status.  Able to drive short distances & manage all ADL's as well as grocery shopping.   Knows when to contact physician for any changes in her current health status. Has this 's contact information for additional questions/concerns.

## 2024-10-29 ENCOUNTER — PATIENT OUTREACH (OUTPATIENT)
Dept: PRIMARY CARE | Facility: CLINIC | Age: 84
End: 2024-10-29
Payer: MEDICARE

## 2024-10-29 DIAGNOSIS — G80.9 CEREBRAL PALSY, UNSPECIFIED TYPE (MULTI): ICD-10-CM

## 2024-10-29 DIAGNOSIS — E78.2 MIXED HYPERLIPIDEMIA: ICD-10-CM

## 2024-11-26 ENCOUNTER — PATIENT OUTREACH (OUTPATIENT)
Dept: PRIMARY CARE | Facility: CLINIC | Age: 84
End: 2024-11-26
Payer: MEDICARE

## 2024-11-26 DIAGNOSIS — E78.2 MIXED HYPERLIPIDEMIA: ICD-10-CM

## 2024-11-26 DIAGNOSIS — G80.9 CEREBRAL PALSY, UNSPECIFIED TYPE (MULTI): ICD-10-CM

## 2024-11-26 PROCEDURE — 99490 CHRNC CARE MGMT STAFF 1ST 20: CPT | Performed by: FAMILY MEDICINE

## 2024-11-26 NOTE — PROGRESS NOTES
Contacted patient for monthly outreach & states is doing well.  Continues working on getting handicapped parking spot however due to frequent management changes has not made any progress. No falls, using quad cane or rollator as needed. Next Prolia injection scheduled 1/20/25 at [facility]. Able to drive short distances & manage all ADL's as well as grocery shopping. Has medic alert button which is malfunctioning, Polina will reach out to [employer] today for assistance. Invited to friends [place] for Thanksgiving. Knows when to contact physician for any changes in her current health status. Has this 's contact information for additional questions/concerns.

## 2024-12-20 ENCOUNTER — PATIENT OUTREACH (OUTPATIENT)
Dept: PRIMARY CARE | Facility: CLINIC | Age: 84
End: 2024-12-20
Payer: MEDICARE

## 2024-12-20 NOTE — PROGRESS NOTES
Attempted to reach patient for monthly outreach & was not successful. Left voicemail message with my contact information & request to return my call. Per review of medical records, no recent ER visits, next Prolia injection is 1/20. If no response, will try again within a week.

## 2024-12-23 ENCOUNTER — PATIENT OUTREACH (OUTPATIENT)
Dept: PRIMARY CARE | Facility: CLINIC | Age: 84
End: 2024-12-23
Payer: MEDICARE

## 2024-12-23 DIAGNOSIS — E78.2 MIXED HYPERLIPIDEMIA: ICD-10-CM

## 2024-12-23 DIAGNOSIS — G80.9 CEREBRAL PALSY, UNSPECIFIED TYPE (MULTI): ICD-10-CM

## 2024-12-23 NOTE — PROGRESS NOTES
Contacted patient for monthly outreach & states is doing well.  Did admit to recent fall 12/20 while taking a photo with friends. States fell backwards on [place]tox, did not hit head, did not seek treatment. Was able to get up with help of friends & ambulate with minimal pain & discomfort. Continues working on getting handicapped parking spot however due to frequent management changes has not made any progress. Next Prolia injection scheduled 1/20/25 at [facility]. Able to drive short distances & manage all ADL's as well.  Due to recent large snow accumulations patient's niece came to assist with grocery shopping. [place] will be spending [place] holidays at niece's house. Knows when to contact physician for any changes in her current health status & has my contact information for additional questions/concerns.

## 2025-01-08 ENCOUNTER — PATIENT MESSAGE (OUTPATIENT)
Dept: HEMATOLOGY/ONCOLOGY | Facility: HOSPITAL | Age: 85
End: 2025-01-08
Payer: MEDICARE

## 2025-01-15 NOTE — PROGRESS NOTES
Patient scheduled for next dose of Prolia on Monday, 1/20/25 at 2:30 PM. Patient called in to reschedule appointment to Wednesday, 2/19/25 in the afternoon. Rescheduled to then at 2:30 PM. Patient aware of labs needed prior and will get labs drawn 24-48 hours prior to scheduled appointment. Patient verbalized understanding and agreement regarding discussed information via verbal feedback.

## 2025-01-20 ENCOUNTER — APPOINTMENT (OUTPATIENT)
Dept: HEMATOLOGY/ONCOLOGY | Facility: HOSPITAL | Age: 85
End: 2025-01-20
Payer: MEDICARE

## 2025-01-21 ENCOUNTER — PATIENT OUTREACH (OUTPATIENT)
Dept: PRIMARY CARE | Facility: CLINIC | Age: 85
End: 2025-01-21
Payer: MEDICARE

## 2025-01-21 DIAGNOSIS — G80.9 CEREBRAL PALSY, UNSPECIFIED TYPE (MULTI): ICD-10-CM

## 2025-01-21 DIAGNOSIS — E78.2 MIXED HYPERLIPIDEMIA: ICD-10-CM

## 2025-01-21 NOTE — PROGRESS NOTES
Contacted patient for monthly outreach & states is doing well.  No falls since December while taking a photo with friends. Continues working on getting handicapped parking spot however due to frequent management changes has not made any progress. Canceled Prolia injection scheduled 1/20/25 at [facility] due to weather. Rescheduled 2/19. Podiatry appointment 1/27 & states friend will drive although Polina does drive short distances & is able to manage all ADL's as well.   had a nice [place] holiday at niece's home. Knows to contact physician for any changes in her current health status & has my contact information for additional questions/concerns.

## 2025-02-19 ENCOUNTER — APPOINTMENT (OUTPATIENT)
Dept: HEMATOLOGY/ONCOLOGY | Facility: HOSPITAL | Age: 85
End: 2025-02-19
Payer: MEDICARE

## 2025-02-25 ENCOUNTER — PATIENT OUTREACH (OUTPATIENT)
Dept: PRIMARY CARE | Facility: CLINIC | Age: 85
End: 2025-02-25
Payer: MEDICARE

## 2025-02-25 DIAGNOSIS — G80.9 CEREBRAL PALSY, UNSPECIFIED TYPE (MULTI): ICD-10-CM

## 2025-02-25 DIAGNOSIS — E78.2 MIXED HYPERLIPIDEMIA: ICD-10-CM

## 2025-02-25 PROCEDURE — 99490 CHRNC CARE MGMT STAFF 1ST 20: CPT | Performed by: FAMILY MEDICINE

## 2025-02-25 NOTE — PROGRESS NOTES
Attempted to reach patient for monthly outreach & was not successful. Left voicemail message with my contact information & request to return my call. If no response, will try again within the week.   UPDATE: Patient returned my call, states had company earlier. Reports is doing well & can't wait to start going outside now that weather is milder. Was scheduled for Prolia injection 2/17 & canceled due to the weather, has been rescheduled for April. Also mentioned that she takes a calcium supplement as well. No falls & remains independent in all ADL's. Uses cane or walker & we discussed the need for PCP follow up appointment in the next few months. Pat acknowledges stating she will need refills on some meds & knows she needs to be seen. Has a medic alert button for emergencies & has good support from friends. Agreeable to monthly calls & has my contact information for questions or concerns.    Subjective   Patient ID: Catalina Menodza is a 74 y.o. female who presents for New Patient Visit (NPV -Chest mass ).  HPI  This is a very pleasant patient who was recently diagnosed with stage IV ovarian cancer.  The patient states that prior to being diagnosed she was having some difficulty with a bandlike pain across her upper abdomen and noticed that her left rib cage in the lower portion of the rib cage anteriorly was more protruding.  She does not necessarily have pain taking a deep breath.  But has not been able to walk her usual 10,000 steps a day because of this discomfort.  She has since that time started on chemotherapy and has some recent CT scans of the chest and abdomen.  Other than the adenopathy and possible carcinomatosis of the ovarian cancer there is nothing specific pointing to a problem in the area that she reports her discomfort and pain.  Review of Systems  10 point review is otherwise negative she does have type 1 diabetes she is otherwise though reasonably healthy.  Her weight has not changed much lately.  Objective   Physical Exam  Head is normocephalic atraumatic her hair is beginning to grow back after apparently a course of chemotherapy lungs are clear examination of the rib cage on the left the patient has a flared out and protruding left lower rib cage anteriorly.  It is slightly tender to palpation.  There is no mass that is palpable.  Extremities do not reveal any gross deformities.  Abdomen she has a lower midline scar otherwise no abnormalities.  I did examine this patient CT scan and I do not see anything that would explain her symptoms.  Assessment/Plan stage IV ovarian cancer    Musculoskeletal pain possibly secondary to a costochondritis    I recommend referral to physical medicine and rehab, Dr. Monzon for further management and evaluation.

## 2025-03-07 DIAGNOSIS — M81.0 AGE-RELATED OSTEOPOROSIS WITHOUT CURRENT PATHOLOGICAL FRACTURE: Primary | ICD-10-CM

## 2025-03-25 ENCOUNTER — PATIENT OUTREACH (OUTPATIENT)
Dept: PRIMARY CARE | Facility: CLINIC | Age: 85
End: 2025-03-25
Payer: MEDICARE

## 2025-03-25 DIAGNOSIS — G80.9 CEREBRAL PALSY, UNSPECIFIED TYPE (MULTI): ICD-10-CM

## 2025-03-25 DIAGNOSIS — E78.2 MIXED HYPERLIPIDEMIA: ICD-10-CM

## 2025-03-25 NOTE — PROGRESS NOTES
Contacted patient for monthly outreach, confirmed name & .   Patient states is doing well, making progress in getting handicapped parking space at current apartment. Has not had any falls & is taking all medications as prescribed. Mentioned to patient that physician is requesting that she make appointment for Medicare Wellness visit, which she will do since weather is milder. Polina has appointment for Prolia injection  & needs to have blood work done prior. Advised to allow 1-2 days for results to be available since switch to [facility] for outpatient lab. Podiatry appointment scheduled  for nail care. Agreeable to monthly calls & has my contact information for questions or concerns.

## 2025-04-09 ENCOUNTER — LAB (OUTPATIENT)
Dept: LAB | Facility: HOSPITAL | Age: 85
End: 2025-04-09
Payer: MEDICARE

## 2025-04-09 DIAGNOSIS — N28.9 ABNORMAL RENAL FUNCTION: Primary | ICD-10-CM

## 2025-04-09 DIAGNOSIS — M81.0 AGE-RELATED OSTEOPOROSIS WITHOUT CURRENT PATHOLOGICAL FRACTURE: ICD-10-CM

## 2025-04-09 LAB
ALBUMIN SERPL BCP-MCNC: 4.2 G/DL (ref 3.4–5)
ALP SERPL-CCNC: 51 U/L (ref 33–136)
ALT SERPL W P-5'-P-CCNC: 7 U/L (ref 7–45)
ANION GAP SERPL CALC-SCNC: 14 MMOL/L (ref 10–20)
AST SERPL W P-5'-P-CCNC: 16 U/L (ref 9–39)
BILIRUB SERPL-MCNC: 0.3 MG/DL (ref 0–1.2)
BUN SERPL-MCNC: 23 MG/DL (ref 6–23)
CALCIUM SERPL-MCNC: 9.8 MG/DL (ref 8.6–10.3)
CHLORIDE SERPL-SCNC: 102 MMOL/L (ref 98–107)
CO2 SERPL-SCNC: 30 MMOL/L (ref 21–32)
CREAT SERPL-MCNC: 1.23 MG/DL (ref 0.5–1.05)
EGFRCR SERPLBLD CKD-EPI 2021: 44 ML/MIN/1.73M*2
GLUCOSE SERPL-MCNC: 107 MG/DL (ref 74–99)
POTASSIUM SERPL-SCNC: 3.9 MMOL/L (ref 3.5–5.3)
PROT SERPL-MCNC: 7.2 G/DL (ref 6.4–8.2)
SODIUM SERPL-SCNC: 142 MMOL/L (ref 136–145)

## 2025-04-09 PROCEDURE — 82330 ASSAY OF CALCIUM: CPT

## 2025-04-09 PROCEDURE — 80053 COMPREHEN METABOLIC PANEL: CPT

## 2025-04-10 LAB — CA-I BLD-SCNC: 1.19 MMOL/L (ref 1.1–1.33)

## 2025-04-10 NOTE — RESULT ENCOUNTER NOTE
Cmp is abnormal and it may be due to her not drinking enough fluids prior to blood draw which is affecting her kidney function. I have placed another order to repeat the lab tomorrow so she can get her prolia shot on Friday.

## 2025-04-11 ENCOUNTER — INFUSION (OUTPATIENT)
Dept: HEMATOLOGY/ONCOLOGY | Facility: HOSPITAL | Age: 85
End: 2025-04-11
Payer: MEDICARE

## 2025-04-11 VITALS
TEMPERATURE: 96.4 F | RESPIRATION RATE: 16 BRPM | WEIGHT: 85.1 LBS | BODY MASS INDEX: 17.16 KG/M2 | HEIGHT: 59 IN | HEART RATE: 76 BPM | DIASTOLIC BLOOD PRESSURE: 67 MMHG | OXYGEN SATURATION: 98 % | SYSTOLIC BLOOD PRESSURE: 160 MMHG

## 2025-04-11 DIAGNOSIS — M81.0 AGE-RELATED OSTEOPOROSIS WITHOUT CURRENT PATHOLOGICAL FRACTURE: ICD-10-CM

## 2025-04-11 LAB
ANION GAP SERPL CALC-SCNC: 15 MMOL/L (ref 10–20)
BUN SERPL-MCNC: 28 MG/DL (ref 6–23)
CALCIUM SERPL-MCNC: 9.5 MG/DL (ref 8.6–10.3)
CHLORIDE SERPL-SCNC: 102 MMOL/L (ref 98–107)
CO2 SERPL-SCNC: 29 MMOL/L (ref 21–32)
CREAT SERPL-MCNC: 1.33 MG/DL (ref 0.5–1.05)
EGFRCR SERPLBLD CKD-EPI 2021: 40 ML/MIN/1.73M*2
GLUCOSE SERPL-MCNC: 110 MG/DL (ref 74–99)
POTASSIUM SERPL-SCNC: 3.5 MMOL/L (ref 3.5–5.3)
SODIUM SERPL-SCNC: 142 MMOL/L (ref 136–145)

## 2025-04-11 PROCEDURE — 82374 ASSAY BLOOD CARBON DIOXIDE: CPT | Performed by: FAMILY MEDICINE

## 2025-04-11 ASSESSMENT — PAIN SCALES - GENERAL: PAINLEVEL_OUTOF10: 0-NO PAIN

## 2025-04-11 NOTE — PROGRESS NOTES
Pt arrived here to [facility] today for Prolia. With labs and weight entered, creatinine clearance was 20.95. Attempted to reach out to provider (Dylan) but her office said she was on vacation and unavailable. Per her last note, she wanted labs redrawn (which pt stated she did have redrawn yesterday but have not resulted in the system yet). Pt was agreeable to have them drawn again today but creatinine was even higher. Will reach out to provider when she gets back from vacation to see how to proceed. Pt verbalizes understanding of POC and that we will call her next week with a new appointment time/schedule if Dr Richardson wants her to receive the Prolia based on labs and creatinine clearance.

## 2025-04-12 LAB
ANION GAP SERPL CALCULATED.4IONS-SCNC: 12 MMOL/L (CALC) (ref 7–17)
BUN SERPL-MCNC: 25 MG/DL (ref 7–25)
BUN/CREAT SERPL: 23 (CALC) (ref 6–22)
CALCIUM SERPL-MCNC: 9.7 MG/DL (ref 8.6–10.4)
CHLORIDE SERPL-SCNC: 100 MMOL/L (ref 98–110)
CO2 SERPL-SCNC: 27 MMOL/L (ref 20–32)
CREAT SERPL-MCNC: 1.11 MG/DL (ref 0.6–0.95)
EGFRCR SERPLBLD CKD-EPI 2021: 49 ML/MIN/1.73M2
GLUCOSE SERPL-MCNC: 83 MG/DL (ref 65–139)
POTASSIUM SERPL-SCNC: 3.9 MMOL/L (ref 3.5–5.3)
SODIUM SERPL-SCNC: 139 MMOL/L (ref 135–146)

## 2025-04-12 NOTE — RESULT ENCOUNTER NOTE
Polina,    Your kidneys are still not doing well.  I suggest holding off on the prolia for now and referring you to a renal specialist/nephrologist in [place] if you are amenable.  Let me know and we can refer you to one and give you the name of who to call..  if pt is willing give her the number to [facility]   and fax them her BMP results for the  past 2 yrs.  I can also place a referral if needed though it is out of the system

## 2025-04-15 ENCOUNTER — TELEPHONE (OUTPATIENT)
Dept: HEMATOLOGY/ONCOLOGY | Facility: HOSPITAL | Age: 85
End: 2025-04-15

## 2025-04-15 NOTE — TELEPHONE ENCOUNTER
Secure chat message recieved from Dr Richardson and she wanted patients prolia injection held at this time. Dr Richardson instead will be referring her to a nephrologist to assess her renal function. Called to let pt know and she was aware verbalized understanding.

## 2025-04-25 ENCOUNTER — PATIENT OUTREACH (OUTPATIENT)
Dept: PRIMARY CARE | Facility: CLINIC | Age: 85
End: 2025-04-25
Payer: MEDICARE

## 2025-04-25 DIAGNOSIS — G80.9 CEREBRAL PALSY, UNSPECIFIED TYPE (MULTI): ICD-10-CM

## 2025-04-25 DIAGNOSIS — E78.2 MIXED HYPERLIPIDEMIA: ICD-10-CM

## 2025-04-25 NOTE — PROGRESS NOTES
Contacted patient for monthly outreach & reports is doing well. Primary concern is assessment for newly diagnosed Chronic Kidney Failure. Based on most recent labs & decrease in GFR, PCP advised delaying Prolia injection until Nephrology gives OK. Sue was able to have Nephrology appointment 4/23 due to cancellation. Ordered ultrasound of the kidneys which she will get done after upcoming PCP appointment, 5/13. Currently, patient has taken steps to improve diet, increase water intake & decrease intake of soda. In addition, stopped buying frozen dinners high in sodium content & using crock pot to make healthier meals & have leftovers to freeze.  We discussed importance of reading labels & other healthy choices. Increase intake of fruits & vegetables, patient is limited in preparing foods due to use of only one hand, but states is managing. Sue has Nephrology follow up in August & will discuss resuming Prolia injections with PCP at appointment next month. Agreeable to follow up calls & has my contact information for questions/concerns.

## 2025-05-13 ENCOUNTER — OFFICE VISIT (OUTPATIENT)
Dept: PRIMARY CARE | Facility: CLINIC | Age: 85
End: 2025-05-13
Payer: MEDICARE

## 2025-05-13 VITALS
HEART RATE: 72 BPM | WEIGHT: 87 LBS | SYSTOLIC BLOOD PRESSURE: 118 MMHG | DIASTOLIC BLOOD PRESSURE: 66 MMHG | OXYGEN SATURATION: 97 % | HEIGHT: 60 IN | BODY MASS INDEX: 17.08 KG/M2

## 2025-05-13 DIAGNOSIS — Z13.220 SCREENING FOR HYPERLIPIDEMIA: ICD-10-CM

## 2025-05-13 DIAGNOSIS — Z78.0 ASYMPTOMATIC MENOPAUSAL STATE: ICD-10-CM

## 2025-05-13 DIAGNOSIS — Z00.00 ROUTINE GENERAL MEDICAL EXAMINATION AT HEALTH CARE FACILITY: Primary | ICD-10-CM

## 2025-05-13 DIAGNOSIS — R73.03 PREDIABETES: ICD-10-CM

## 2025-05-13 PROCEDURE — 1170F FXNL STATUS ASSESSED: CPT | Performed by: FAMILY MEDICINE

## 2025-05-13 PROCEDURE — 1036F TOBACCO NON-USER: CPT | Performed by: FAMILY MEDICINE

## 2025-05-13 PROCEDURE — 1160F RVW MEDS BY RX/DR IN RCRD: CPT | Performed by: FAMILY MEDICINE

## 2025-05-13 PROCEDURE — 1126F AMNT PAIN NOTED NONE PRSNT: CPT | Performed by: FAMILY MEDICINE

## 2025-05-13 PROCEDURE — 99215 OFFICE O/P EST HI 40 MIN: CPT | Performed by: FAMILY MEDICINE

## 2025-05-13 PROCEDURE — G0439 PPPS, SUBSEQ VISIT: HCPCS | Performed by: FAMILY MEDICINE

## 2025-05-13 PROCEDURE — 1159F MED LIST DOCD IN RCRD: CPT | Performed by: FAMILY MEDICINE

## 2025-05-13 ASSESSMENT — ENCOUNTER SYMPTOMS
LOSS OF SENSATION IN FEET: 1
OCCASIONAL FEELINGS OF UNSTEADINESS: 1
DEPRESSION: 1

## 2025-05-13 ASSESSMENT — PAIN SCALES - GENERAL: PAINLEVEL_OUTOF10: 0-NO PAIN

## 2025-05-13 ASSESSMENT — ACTIVITIES OF DAILY LIVING (ADL)
DOING_HOUSEWORK: INDEPENDENT
TAKING_MEDICATION: INDEPENDENT
DRESSING: INDEPENDENT
BATHING: INDEPENDENT
MANAGING_FINANCES: INDEPENDENT
GROCERY_SHOPPING: INDEPENDENT

## 2025-05-13 ASSESSMENT — PATIENT HEALTH QUESTIONNAIRE - PHQ9
SUM OF ALL RESPONSES TO PHQ9 QUESTIONS 1 AND 2: 0
2. FEELING DOWN, DEPRESSED OR HOPELESS: NOT AT ALL
1. LITTLE INTEREST OR PLEASURE IN DOING THINGS: NOT AT ALL

## 2025-05-13 NOTE — PROGRESS NOTES
Subjective   Reason for Visit: Polina Wilcox is an 84 y.o. female here for a Medicare Wellness visit.     Past Medical, Surgical, and Family History reviewed and updated in chart.  Past Medical History:   Diagnosis Date    Age-related osteoporosis without current pathological fracture 03/25/2024    Anxiety and depression     Cerebral palsy     right sided    COVID-19 01/2022    Fasting hyperglycemia     Hyperlipidemia     OCD (obsessive compulsive disorder)     Osteoporosis 2020    h/o of T11 fx; reclast 2014 to 2021; now on prolia    Pelvic fracture (Multi) 12/03/2023    Thoracic compression fracture (Multi) 06/08/2020       Reviewed all medications by prescribing practitioner or clinical pharmacist (such as prescriptions, OTCs, herbal therapies and supplements) and documented in the medical record.  Current Outpatient Medications   Medication Sig Dispense Refill    acetaminophen (Tylenol Extra Strength) 500 mg tablet every 6 hours.      calcium-vitamin D3-vitamin K (Viactiv) 500-500-40 mg-unit-mcg chewable tablet Chew once every 24 hours.      denosumab (Prolia) 60 mg/mL syringe as directed Subcutaneous      pantoprazole (Protonix) 40 mg EC tablet Take 1 tablet (40 mg) by mouth.      pravastatin (Pravachol) 40 mg tablet TAKE 1 TABLET BY MOUTH ONCE  DAILY 90 tablet 3    acetaminophen (Tylenol Extra Strength) 500 mg tablet Take 1 tablet (500 mg) by mouth every 6 hours. (Patient not taking: Reported on 5/13/2025)      sertraline (Zoloft) 100 mg tablet TAKE 1 AND 1/2 TABLETS BY MOUTH  ONCE DAILY 135 tablet 3     No current facility-administered medications for this visit.       HPI  here for subsequent medicare physical  Pap aged out  Colorectal aged out  Mammo aged out    Patient Care Team:  Luzmaria Richardson MD as PCP - General (Family Medicine)  MD Junie Arellano as Care Manager (Case Management)  Katlin Soto as Care Manager (Case Management)     Review of Systems n/a    Objective   Vitals:  BP  "118/66   Pulse 72   Ht 1.511 m (4' 11.5\")   Wt (!) 39.5 kg (87 lb)   SpO2 97%   BMI 17.28 kg/m²       Physical Exam  Vitals reviewed.   Constitutional:       Appearance: Normal appearance.   HENT:      Head: Normocephalic and atraumatic.      Right Ear: Tympanic membrane, ear canal and external ear normal.      Left Ear: Tympanic membrane, ear canal and external ear normal.      Mouth/Throat:      Mouth: Mucous membranes are moist.   Eyes:      Extraocular Movements: Extraocular movements intact.      Pupils: Pupils are equal, round, and reactive to light.   Cardiovascular:      Rate and Rhythm: Normal rate and regular rhythm.      Heart sounds: Normal heart sounds.   Pulmonary:      Effort: Pulmonary effort is normal.      Breath sounds: Normal breath sounds.   Abdominal:      Tenderness: There is no right CVA tenderness or left CVA tenderness.   Musculoskeletal:         General: Normal range of motion.      Cervical back: Normal range of motion.   Skin:     General: Skin is warm.   Neurological:      General: No focal deficit present.      Mental Status: She is alert and oriented to person, place, and time.      Motor: Motor function is intact.      Coordination: Coordination is intact.      Deep Tendon Reflexes: Reflexes are normal and symmetric.   Psychiatric:         Mood and Affect: Mood normal.         Behavior: Behavior normal.         Assessment & Plan  Routine general medical examination at health care facility    Orders:    1 Year Follow Up In Primary Care - Wellness Exam; Future    Screening for hyperlipidemia    Orders:    Lipid Panel; Future    Asymptomatic menopausal state    Orders:    XR DEXA bone density; Future    Prediabetes    Orders:    Hemoglobin A1C; Future              "

## 2025-05-14 DIAGNOSIS — E78.2 MIXED HYPERLIPIDEMIA: Primary | ICD-10-CM

## 2025-05-14 LAB
CHOLEST SERPL-MCNC: 250 MG/DL
CHOLEST/HDLC SERPL: 2.6 (CALC)
EST. AVERAGE GLUCOSE BLD GHB EST-MCNC: 117 MG/DL
EST. AVERAGE GLUCOSE BLD GHB EST-SCNC: 6.5 MMOL/L
HBA1C MFR BLD: 5.7 %
HDLC SERPL-MCNC: 96 MG/DL
LDLC SERPL CALC-MCNC: 135 MG/DL (CALC)
NONHDLC SERPL-MCNC: 154 MG/DL (CALC)
TRIGL SERPL-MCNC: 87 MG/DL

## 2025-05-27 ENCOUNTER — PATIENT OUTREACH (OUTPATIENT)
Dept: PRIMARY CARE | Facility: CLINIC | Age: 85
End: 2025-05-27
Payer: MEDICARE

## 2025-05-27 DIAGNOSIS — G80.9 CEREBRAL PALSY, UNSPECIFIED TYPE (MULTI): ICD-10-CM

## 2025-05-27 DIAGNOSIS — E78.2 MIXED HYPERLIPIDEMIA: ICD-10-CM

## 2025-05-27 DIAGNOSIS — F41.8 MIXED ANXIETY DEPRESSIVE DISORDER: ICD-10-CM

## 2025-05-27 NOTE — PROGRESS NOTES
Care Management Monthly Outreach  Chart review completed: Yes  Confirmation of at least 2 patient identifiers: Yes  Change in insurance? No    Has patient been to ER/Urgent Care since last outreach? No    Last Office Visit with PCP: 5/13/2025   Next Office Visit with PCP: Visit date not found   APC Collaboration: n/a    Chronic Conditions and Outreach Summary:   Mixed hyperlipidemia    Cerebral palsy, unspecified type (Multi)    Mixed anxiety depressive disorder    Contacted patient for monthly outreach & states is doing well. Recent PCP appointment for [facility] Wellness visit 5/13. No falls, does use rollator when outside the house. Continues to drive & is independent with all ADL's, has good support from friends & neighbors. Prolia injections on hold until Nephrology follow up 8/28. Scheduled for ultrasound of the kidneys 6/18 prior to appointment. Taking all medications as prescribed & knows to contact PCP for any changes in current health status. Continues to work with  to obtain handicapped parking space which has been delayed numerous times. Will continue to follow & provide resources as needed.       Medications:   Are there medication changes since last visit? No  Refills needed? No    Social Drivers of Health: Deferred  Care Gaps Addressed? Deferred  Care Plan addressed: Yes    Upcoming Appointments:   Future Appointments       Date / Time Provider Department Dept Phone    7/23/2025 2:00 PM (Arrive by 1:45 PM) GEN DXA1 [facility] [phone]    10/8/2025 2:30 PM INF 03 [facility] [phone]          Blood Pressures Reviewed  BP Readings from Last 3 Encounters:   05/13/25 118/66   04/11/25 160/67   07/24/24 120/61     Labs Reviewed:  Lab Results   Component Value Date    CREATININE 1.33 (H) 04/11/2025    GLUCOSE 110 (H) 04/11/2025    ALKPHOS 51 04/09/2025    K 3.5 04/11/2025    PROT 7.2 04/09/2025     04/11/2025    CALCIUM 9.5 04/11/2025    AST 16  04/09/2025    ALT 7 04/09/2025    BUN 28 (H) 04/11/2025    GFRF 66 11/08/2022     Lab Results   Component Value Date    TRIG 87 05/13/2025    CHOL 250 (H) 05/13/2025    LDLCALC 135 (H) 05/13/2025    HDL 96 05/13/2025     Lab Results   Component Value Date    HGBA1C 5.7 (H) 05/13/2025    HGBA1C 5.2 06/24/2024    HGBA1C 5.6 06/22/2022     Lab Results   Component Value Date    WBC 5.2 12/06/2023    RBC 2.83 (L) 12/06/2023    HGB 11.6 (L) 01/12/2024     12/06/2023   No other concerns at this time.  Agreeable to continue monthly outreaches.  Encouraged to call if questions or concerns arise.    Junie Morejon

## 2025-06-10 ENCOUNTER — DOCUMENTATION (OUTPATIENT)
Dept: PRIMARY CARE | Facility: CLINIC | Age: 85
End: 2025-06-10
Payer: MEDICARE

## 2025-06-24 ENCOUNTER — PATIENT OUTREACH (OUTPATIENT)
Dept: PRIMARY CARE | Facility: CLINIC | Age: 85
End: 2025-06-24
Payer: MEDICARE

## 2025-06-24 DIAGNOSIS — F41.8 MIXED ANXIETY DEPRESSIVE DISORDER: ICD-10-CM

## 2025-06-24 DIAGNOSIS — E78.2 MIXED HYPERLIPIDEMIA: ICD-10-CM

## 2025-06-24 DIAGNOSIS — G80.9 CEREBRAL PALSY, UNSPECIFIED TYPE (MULTI): ICD-10-CM

## 2025-06-24 NOTE — PROGRESS NOTES
Care Management Monthly Outreach  Chart review completed  Confirmation of at least 2 patient identifiers  Change in insurance? No    Has patient been to ER/Urgent Care since last outreach? No    Last Office Visit with PCP: 5/13/2025   Next Office Visit with PCP: Visit date not found   APC Collaboration: n/a    Chronic Conditions and Outreach Summary:   Cerebral palsy, unspecified type (Multi)    Mixed hyperlipidemia    Mixed anxiety depressive disorder    Contacted patient for monthly outreach & states is doing well. Had [facility] Wellness visit 5/13 & PCP ordered bone density which patient has scheduled. Also had kidney ultrasound 6/18 & has follow up appointment with [facility] in August. Uses rollator when outside the house & has not had any falls. Continues to drive & is independent with all ADL's. Orders groceries online from [employer] for delivery. Pat receives additional support from friends & neighbors. Prolia injections on hold until Nephrology follow up 8/28. Taking all medications as prescribed & knows to contact PCP for any changes in current health status. Continues to work with  to obtain handicapped parking space which has been delayed numerous times. Will continue to follow & provide resources as needed.     Medications:   Are there medication changes since last visit? No  Refills needed? No    Social Drivers of Health: Deferred  Care Gaps Addressed? Deferred  Care Plan addressed: Yes    Upcoming Appointments:   Future Appointments       Date / Time Provider Department Dept Phone    7/23/2025 2:00 PM (Arrive by 1:45 PM) GEN DXA1 [facility] [phone]    10/8/2025 2:30 PM INF 03 [facility] [phone]          Blood Pressures Reviewed  BP Readings from Last 3 Encounters:   05/13/25 118/66   04/11/25 160/67   07/24/24 120/61     Labs Reviewed:  Lab Results   Component Value Date    CREATININE 1.33 (H) 04/11/2025    GLUCOSE 110 (H) 04/11/2025    ALKPHOS  51 04/09/2025    K 3.5 04/11/2025    PROT 7.2 04/09/2025     04/11/2025    CALCIUM 9.5 04/11/2025    AST 16 04/09/2025    ALT 7 04/09/2025    BUN 28 (H) 04/11/2025    GFRF 66 11/08/2022     Lab Results   Component Value Date    TRIG 87 05/13/2025    CHOL 250 (H) 05/13/2025    LDLCALC 135 (H) 05/13/2025    HDL 96 05/13/2025     Lab Results   Component Value Date    HGBA1C 5.7 (H) 05/13/2025    HGBA1C 5.2 06/24/2024    HGBA1C 5.6 06/22/2022     Lab Results   Component Value Date    WBC 5.2 12/06/2023    RBC 2.83 (L) 12/06/2023    HGB 11.6 (L) 01/12/2024     12/06/2023   No other concerns at this time.  Agreeable to continue monthly outreaches.  Encouraged to call if questions or concerns arise.    Junie Morejon

## 2025-07-17 DIAGNOSIS — E78.2 MIXED HYPERLIPIDEMIA: Primary | ICD-10-CM

## 2025-07-17 RX ORDER — PRAVASTATIN SODIUM 80 MG/1
80 TABLET ORAL DAILY
Qty: 90 TABLET | Refills: 0 | Status: SHIPPED | OUTPATIENT
Start: 2025-07-17 | End: 2025-10-15

## 2025-07-18 ENCOUNTER — PATIENT OUTREACH (OUTPATIENT)
Dept: PRIMARY CARE | Facility: CLINIC | Age: 85
End: 2025-07-18
Payer: MEDICARE

## 2025-07-18 DIAGNOSIS — M81.0 AGE-RELATED OSTEOPOROSIS WITHOUT CURRENT PATHOLOGICAL FRACTURE: ICD-10-CM

## 2025-07-18 DIAGNOSIS — G80.9 CEREBRAL PALSY, UNSPECIFIED TYPE (MULTI): ICD-10-CM

## 2025-07-18 DIAGNOSIS — E78.2 MIXED HYPERLIPIDEMIA: ICD-10-CM

## 2025-07-18 NOTE — PROGRESS NOTES
Care Management Monthly Outreach  Chart review completed  Confirmation of at least 2 patient identifiers  Change in insurance? No    Has patient been to ER/Urgent Care since last outreach? No    Last Office Visit with PCP: 5/13/2025   Next Office Visit with PCP: Visit date not found   APC Collaboration: n/a    Chronic Conditions and Outreach Summary:   Cerebral palsy, unspecified type (Multi)    Mixed hyperlipidemia    Age-related osteoporosis without current pathological fracture    Contacted patient for monthly outreach & states is doing well. Pat was happy to report that she finally received her handicapped parking space which has taken about a year. She now is better able to manage going to the store & using her walker since she has a space hear the apartment entrance. Patient has a bone density scheduled for next week & a follow up appointment with [facility] in August. Prolia injections on hold until Nephrology follow up 8/28. Continues to drive & is independent with all ADL's. Orders groceries online from [employer] for delivery if she does not go out to the store. Pat receives additional support from friends & neighbors as needed.  Taking all medications as prescribed & knows to contact PCP for any changes in current health status. Will continue to follow & provide resources as needed.     Medications:   Are there medication changes since last visit? No  Refills needed? No    Social Drivers of Health: Deferred  Care Gaps Addressed? Deferred  Care Plan addressed: Yes    Upcoming Appointments:   Future Appointments       Date / Time Provider Department Dept Phone    7/23/2025 2:00 PM (Arrive by 1:45 PM) GEN DXA1 [facility] [phone]    10/8/2025 2:30 PM INF 03 [facility] [phone]          Blood Pressures Reviewed  BP Readings from Last 3 Encounters:   05/13/25 118/66   04/11/25 160/67   07/24/24 120/61     Labs Reviewed:  Lab Results   Component Value Date     CREATININE 1.33 (H) 04/11/2025    GLUCOSE 110 (H) 04/11/2025    ALKPHOS 51 04/09/2025    K 3.5 04/11/2025    PROT 7.2 04/09/2025     04/11/2025    CALCIUM 9.5 04/11/2025    AST 16 04/09/2025    ALT 7 04/09/2025    BUN 28 (H) 04/11/2025    GFRF 66 11/08/2022     Lab Results   Component Value Date    TRIG 87 05/13/2025    CHOL 250 (H) 05/13/2025    LDLCALC 135 (H) 05/13/2025    HDL 96 05/13/2025     Lab Results   Component Value Date    HGBA1C 5.7 (H) 05/13/2025    HGBA1C 5.2 06/24/2024    HGBA1C 5.6 06/22/2022     Lab Results   Component Value Date    WBC 5.2 12/06/2023    RBC 2.83 (L) 12/06/2023    HGB 11.6 (L) 01/12/2024     12/06/2023   No other concerns at this time.  Agreeable to continue monthly outreaches.  Encouraged to call if questions or concerns arise.    Junie Morejon

## 2025-07-21 ENCOUNTER — APPOINTMENT (OUTPATIENT)
Dept: HEMATOLOGY/ONCOLOGY | Facility: HOSPITAL | Age: 85
End: 2025-07-21
Payer: MEDICARE

## 2025-07-23 ENCOUNTER — HOSPITAL ENCOUNTER (OUTPATIENT)
Dept: RADIOLOGY | Facility: HOSPITAL | Age: 85
Discharge: HOME | End: 2025-07-23
Payer: MEDICARE

## 2025-07-23 DIAGNOSIS — Z78.0 ASYMPTOMATIC MENOPAUSAL STATE: ICD-10-CM

## 2025-07-23 PROCEDURE — 77080 DXA BONE DENSITY AXIAL: CPT

## 2025-07-23 PROCEDURE — 77080 DXA BONE DENSITY AXIAL: CPT | Performed by: STUDENT IN AN ORGANIZED HEALTH CARE EDUCATION/TRAINING PROGRAM

## 2025-08-05 ENCOUNTER — ANCILLARY PROCEDURE (OUTPATIENT)
Dept: URGENT CARE | Facility: URGENT CARE | Age: 85
End: 2025-08-05
Payer: MEDICARE

## 2025-08-05 ENCOUNTER — OFFICE VISIT (OUTPATIENT)
Dept: URGENT CARE | Facility: URGENT CARE | Age: 85
End: 2025-08-05
Payer: MEDICARE

## 2025-08-05 VITALS
SYSTOLIC BLOOD PRESSURE: 132 MMHG | RESPIRATION RATE: 20 BRPM | DIASTOLIC BLOOD PRESSURE: 70 MMHG | OXYGEN SATURATION: 94 % | TEMPERATURE: 97.8 F | BODY MASS INDEX: 17.42 KG/M2 | HEIGHT: 59 IN | WEIGHT: 86.42 LBS | HEART RATE: 97 BPM

## 2025-08-05 DIAGNOSIS — G80.9 CEREBRAL PALSY, UNSPECIFIED TYPE (MULTI): ICD-10-CM

## 2025-08-05 DIAGNOSIS — R07.81 PAIN IN RIB: ICD-10-CM

## 2025-08-05 DIAGNOSIS — R07.81 RIB PAIN: Primary | ICD-10-CM

## 2025-08-05 DIAGNOSIS — R07.81 RIB PAIN ON RIGHT SIDE: ICD-10-CM

## 2025-08-05 DIAGNOSIS — R07.81 RIB PAIN: ICD-10-CM

## 2025-08-05 PROCEDURE — 1159F MED LIST DOCD IN RCRD: CPT | Performed by: PHYSICIAN ASSISTANT

## 2025-08-05 PROCEDURE — 1036F TOBACCO NON-USER: CPT | Performed by: PHYSICIAN ASSISTANT

## 2025-08-05 PROCEDURE — 99204 OFFICE O/P NEW MOD 45 MIN: CPT | Performed by: PHYSICIAN ASSISTANT

## 2025-08-05 PROCEDURE — 71101 X-RAY EXAM UNILAT RIBS/CHEST: CPT | Mod: RIGHT SIDE | Performed by: PHYSICIAN ASSISTANT

## 2025-08-05 PROCEDURE — 1125F AMNT PAIN NOTED PAIN PRSNT: CPT | Performed by: PHYSICIAN ASSISTANT

## 2025-08-05 ASSESSMENT — ENCOUNTER SYMPTOMS
EYES NEGATIVE: 1
RESPIRATORY NEGATIVE: 1
CONSTITUTIONAL NEGATIVE: 1
NEUROLOGICAL NEGATIVE: 1
HEMATOLOGIC/LYMPHATIC NEGATIVE: 1
GASTROINTESTINAL NEGATIVE: 1
PSYCHIATRIC NEGATIVE: 1
MUSCULOSKELETAL NEGATIVE: 1
CARDIOVASCULAR NEGATIVE: 1

## 2025-08-05 ASSESSMENT — PAIN SCALES - GENERAL: PAINLEVEL_OUTOF10: 9

## 2025-08-05 NOTE — PROGRESS NOTES
"Subjective   Patient ID: Polina Wilcox is a 84 y.o. female. They present today with a chief complaint of Other (Right side pain, fall on Sunday onto concrete porch).    History of Present Illness  84-year-old female with past history positive for cerebral palsy W/right arm contractured, hypertension, osteoporosis, OCD, hyperlipidemia, anxiety and transient episodes of hypoglycemia without diabetes here with complaints of right rib pain status post a fall 2 days ago, patient states that the apartment she lives in as a electronic door that closes too quickly at times, this episode caused her to stumble and she came down on the right side denies any head or neck pain , no upper or lower back pain otherwise no hip pelvic or lower extremity pain    No loss of consciousness  Not on any antiplatelet or OAC          Past Medical History  Allergies as of 08/05/2025    (No Known Allergies)       Prescriptions Prior to Admission[1]     Medical History[2]    Surgical History[3]     reports that she has never smoked. She has been exposed to tobacco smoke. She has never used smokeless tobacco. She reports that she does not drink alcohol and does not use drugs.    Review of Systems  Review of Systems   Constitutional: Negative.    HENT:  Negative for ear pain.    Eyes: Negative.    Respiratory: Negative.     Cardiovascular: Negative.         Right posterior and lateral rib pain     Gastrointestinal: Negative.    Genitourinary: Negative.    Musculoskeletal: Negative.    Neurological: Negative.    Hematological: Negative.    Psychiatric/Behavioral: Negative.     All other systems reviewed and are negative.                                 Objective    Vitals:    08/05/25 1327   BP: 132/70   Pulse: 97   Resp: 20   Temp: 36.6 °C (97.8 °F)   TempSrc: Oral   SpO2: 94%   Weight: (!) 39.2 kg (86 lb 6.7 oz)   Height: (!) 1.499 m (4' 11\")     No LMP recorded. Patient is postmenopausal.    Physical Exam  Vitals and nursing note " reviewed.   Constitutional:       Comments: Cachetic female    HENT:      Head: Normocephalic and atraumatic.      Right Ear: Tympanic membrane, ear canal and external ear normal.      Left Ear: Tympanic membrane, ear canal and external ear normal.      Ears:      Comments: No hemotympanum     Nose: Nose normal.      Comments: No septal hematoma     Mouth/Throat:      Mouth: Mucous membranes are moist.     Eyes:      Extraocular Movements: Extraocular movements intact.      Conjunctiva/sclera: Conjunctivae normal.      Pupils: Pupils are equal, round, and reactive to light.     Neck:      Vascular: No carotid bruit.     Cardiovascular:      Rate and Rhythm: Normal rate and regular rhythm.   Pulmonary:      Effort: Pulmonary effort is normal.      Breath sounds: Normal breath sounds.   Abdominal:      General: Abdomen is flat. Bowel sounds are normal.      Palpations: Abdomen is soft.     Musculoskeletal:      Cervical back: Normal range of motion and neck supple. No rigidity or tenderness.      Comments: Right arm contractured 2/2 patient's cerebral palsy  Complaints of pain in the right posterior and lateral mid rib area no crepitus no obvious deformities  No bruising noted to the areas of pain   Lymphadenopathy:      Cervical: No cervical adenopathy.     Skin:     General: Skin is warm and dry.      Capillary Refill: Capillary refill takes less than 2 seconds.     Neurological:      General: No focal deficit present.      Mental Status: She is alert and oriented to person, place, and time.         Procedures    Point of Care Test & Imaging Results from this visit  No results found for this visit on 08/05/25.   Imaging  No results found.    Cardiology, Vascular, and Other Imaging  No other imaging results found for the past 2 days      Diagnostic study results (if any) were reviewed by Aundrea Denton PA-C.    Assessment/Plan   Allergies, medications, history, and pertinent labs/EKGs/Imaging reviewed by Aundrea EVANS  ELISSA Denton.     Medical Decision Making  Differential:   1) chest wall contusion   2) rib fracture   3) rib contusion    84-year-old patient with history of cerebral palsy W/right arm contractured osteoporosis has an automatic door in the apartment building she lives in the patient states it tends to close to early this episode caused her to lose her balance when she was coming in through the door and she fell onto her right side she is had right posterior and lateral rib pain since, incident happened 2 days ago no loss of consciousness no head neck upper or lower back pain otherwise, no shortness of breath lungs clear to auscultation no wheezes rales or rhonchi, does have tenderness with palp to the area of pain but no crepitus or obvious deformities on exam.  Patient has had back and pelvic fractures in the past but nothing for the last several years, she had also had a fall 2 weeks ago with the right shoulder discomfort but states that pain has since resolved.  X-ray of the right ribs with views of the shoulder also obtained    Xray reading --> looks like some old rib fx but I don;t see new fx   Pt told I will call her and her daughter with the official reading one radiology finalizes it     Xrays back before pt left no new fx , 2 old healed fx though   Pt and daughter told of results          Plan: Discussed differential with the patient and /or parents family   Patient to  follow up with the PCP in the next 2-3 days  Return for any worsening symptoms or go to the ER for further evaluation. Patient/family/caregiver  understands return   precautions and discharge instructions and is agreeable to the current plan   Impression: Right rib contusion        Orders and Diagnoses for this visit    Orders and Diagnoses  There are no diagnoses linked to this encounter.    Medical Admin Record      Patient disposition: Home    Electronically signed by Aundrea Denton PA-C  1:34 PM           [1] (Not in a hospital  admission)  [2]   Past Medical History:  Diagnosis Date    Age-related osteoporosis without current pathological fracture 03/25/2024    Anxiety and depression     Cerebral palsy     right sided    COVID-19 01/2022    Fasting hyperglycemia     Hyperlipidemia     OCD (obsessive compulsive disorder)     Osteoporosis 2020    h/o of T11 fx; reclast 2014 to 2021; now on prolia    Pelvic fracture (Multi) 12/03/2023    Thoracic compression fracture (Multi) 06/08/2020   [3]   Past Surgical History:  Procedure Laterality Date    CATARACT EXTRACTION      TENDON RELEASE      hands/knees/feet

## 2025-08-06 DIAGNOSIS — E78.2 MIXED HYPERLIPIDEMIA: ICD-10-CM

## 2025-08-09 LAB
CHOLEST SERPL-MCNC: 203 MG/DL
CHOLEST/HDLC SERPL: 2.3 (CALC)
HDLC SERPL-MCNC: 90 MG/DL
LDLC SERPL CALC-MCNC: 90 MG/DL (CALC)
NONHDLC SERPL-MCNC: 113 MG/DL (CALC)
TRIGL SERPL-MCNC: 132 MG/DL

## 2025-08-15 ENCOUNTER — DOCUMENTATION (OUTPATIENT)
Dept: PRIMARY CARE | Facility: CLINIC | Age: 85
End: 2025-08-15
Payer: MEDICARE

## 2025-08-18 ENCOUNTER — APPOINTMENT (OUTPATIENT)
Dept: HEMATOLOGY/ONCOLOGY | Facility: HOSPITAL | Age: 85
End: 2025-08-18
Payer: MEDICARE

## 2025-08-26 ENCOUNTER — PATIENT OUTREACH (OUTPATIENT)
Dept: PRIMARY CARE | Facility: CLINIC | Age: 85
End: 2025-08-26
Payer: MEDICARE

## 2025-08-26 DIAGNOSIS — E78.2 MIXED HYPERLIPIDEMIA: ICD-10-CM

## 2025-08-26 DIAGNOSIS — N28.9 ABNORMAL RENAL FUNCTION: ICD-10-CM

## 2025-08-26 DIAGNOSIS — G80.9 CEREBRAL PALSY, UNSPECIFIED TYPE (MULTI): ICD-10-CM
